# Patient Record
Sex: FEMALE | Race: WHITE | NOT HISPANIC OR LATINO | Employment: OTHER | URBAN - METROPOLITAN AREA
[De-identification: names, ages, dates, MRNs, and addresses within clinical notes are randomized per-mention and may not be internally consistent; named-entity substitution may affect disease eponyms.]

---

## 2017-01-31 ENCOUNTER — OFFICE VISIT (OUTPATIENT)
Dept: OTOLARYNGOLOGY | Facility: CLINIC | Age: 62
End: 2017-01-31
Payer: COMMERCIAL

## 2017-01-31 VITALS
TEMPERATURE: 98 F | HEART RATE: 72 BPM | WEIGHT: 116.88 LBS | BODY MASS INDEX: 20.7 KG/M2 | DIASTOLIC BLOOD PRESSURE: 69 MMHG | SYSTOLIC BLOOD PRESSURE: 100 MMHG

## 2017-01-31 DIAGNOSIS — H61.23 BILATERAL IMPACTED CERUMEN: Primary | ICD-10-CM

## 2017-01-31 PROCEDURE — 69210 REMOVE IMPACTED EAR WAX UNI: CPT | Mod: S$GLB,,, | Performed by: ORTHOPAEDIC SURGERY

## 2017-01-31 PROCEDURE — 99999 PR PBB SHADOW E&M-EST. PATIENT-LVL III: CPT | Mod: PBBFAC,,, | Performed by: ORTHOPAEDIC SURGERY

## 2017-01-31 PROCEDURE — 99499 UNLISTED E&M SERVICE: CPT | Mod: S$GLB,,, | Performed by: ORTHOPAEDIC SURGERY

## 2017-01-31 NOTE — MR AVS SNAPSHOT
[facility]  [address] [place] [address]  Phone: [phone]  Fax: [phone]                  Makayla Ferrer   2017 8:30 AM   Office Visit    Description:  Female : 1955   Provider:  Manuela Ratliff MD   Department[facility]  [facility]           Reason for Visit     Otalgia           Diagnoses this Visit        Comments    Bilateral impacted cerumen    -  Primary            To Do List           Future Appointments        Provider Department Dept Phone    2017 2:15 PM Manuela Ratliff MD [facility] [phone]    2017 12:40 PM Larissa Tello PA-C [facility] [phone]      Goals (5 Years of Data)     None      Follow-Up and Disposition     Return if symptoms worsen or fail to improve.    Follow-up and Disposition History      [facility]     [facility] -  Assistance  Registered nurses in the [facility] provide clinical advisement, health education, appointment booking, and other advisory services.  Call for this free service at [phone].             Medications           Message regarding Medications     Verify the changes and/or additions to your medication regime listed below are the same as discussed with your clinician today.  If any of these changes or additions are incorrect, please notify your healthcare provider.             Verify that the below list of medications is an accurate representation of the medications you are currently taking.  If none reported, the list may be blank. If incorrect, please contact your healthcare provider. Carry this list with you in case of emergency.           Current Medications     artificial tears,hypromellose, 0.3 % Drop Apply to eye.    cholecalciferol, vitamin D3, (VITAMIN D3) 2,000 unit Cap Take 1 capsule by mouth.    FLAXSEED OIL ORAL Take by mouth.    ibandronate (BONIVA) 150 mg tablet Take 150 mg by mouth every 30 days.    METAMUCIL Powd Take by mouth.    polyethylene  glycol (MIRALAX) 17 gram/dose powder Take 17 g by mouth.    magnesium gluconate 27.5 mg (500 mg) Tab Take 500 mg by mouth.           Clinical Reference Information           Vital Signs - Last Recorded  Most recent update: 1/31/2017  8:43 AM by Kemi Hernandez LPN    BP Pulse Temp Wt BMI    100/69 (BP Location: Left arm, Patient Position: Sitting, BP Method: Automatic) 72 97.9 °F (36.6 °C) (Tympanic) 53 kg (116 lb 13.5 oz) 20.7 kg/m2      Blood Pressure          Most Recent Value    BP  100/69      Allergies as of 1/31/2017     Shellfish Containing Products    Alendronate    Cephalexin      Immunizations Administered on Date of Encounter - 1/31/2017     None

## 2017-01-31 NOTE — PROGRESS NOTES
Subjective:   Cerumen impactions     Patient ID: Makayla Ferrer is a 61 y.o. female.    Chief Complaint:  Excessive ear wax     Makayla Ferrer is a 61 y.o. female here to see me today for evaluation of a possible wax impaction in bilateral ears.  She has complaints of hearing loss in the affected ear, but denies pain or drainage.  This has been an issue in the past.  The patient has not been using any sort of ear drop to soften the wax.  She has noted a buzzing sound in her right ear for the last few weeks with her cerumen impaction.    HPI  Review of Systems   HENT: Positive for hearing loss. Negative for ear discharge, ear pain and tinnitus.        Objective:     Physical Exam   HENT:   Right Ear: External ear and ear canal normal. Decreased hearing is noted.   Left Ear: External ear and ear canal normal. Decreased hearing is noted.   Bilateral complete cerumen impactions, removal described below       Procedure Note    CHIEF COMPLAINT:  Cerumen Impaction    Description:  The patient was seated in an exam chair.  An ear speculum was placed in the right EAC and was examined under the microscope.  Suction and/or loop curettes were used to remove a large cerumen impaction.  The tympanic membrane was visualized and was normal in appearance.  The procedure was repeated on the left side in a similar fashion.  The TM was intact and normal on this side as well.  The patient tolerated the procedure well.          Assessment:     1. Bilateral impacted cerumen        Plan:     1.  Cerumen impaction:  Removed today without difficulty.  I would recommend the use of a wax softening drop, either over the counter Debrox or mineral oil, on a weekly basis.  I also instructed the patient to avoid Qtips.

## 2017-06-01 ENCOUNTER — OFFICE VISIT (OUTPATIENT)
Dept: OTOLARYNGOLOGY | Facility: CLINIC | Age: 62
End: 2017-06-01
Payer: COMMERCIAL

## 2017-06-01 VITALS
WEIGHT: 119.06 LBS | HEART RATE: 65 BPM | BODY MASS INDEX: 21.09 KG/M2 | DIASTOLIC BLOOD PRESSURE: 71 MMHG | SYSTOLIC BLOOD PRESSURE: 111 MMHG | HEIGHT: 63 IN | TEMPERATURE: 98 F

## 2017-06-01 DIAGNOSIS — H61.23 BILATERAL IMPACTED CERUMEN: Primary | ICD-10-CM

## 2017-06-01 PROCEDURE — 99999 PR PBB SHADOW E&M-EST. PATIENT-LVL III: CPT | Mod: PBBFAC,,, | Performed by: PHYSICIAN ASSISTANT

## 2017-06-01 PROCEDURE — 99499 UNLISTED E&M SERVICE: CPT | Mod: S$GLB,,, | Performed by: PHYSICIAN ASSISTANT

## 2017-06-01 PROCEDURE — 69210 REMOVE IMPACTED EAR WAX UNI: CPT | Mod: S$GLB,,, | Performed by: PHYSICIAN ASSISTANT

## 2017-06-01 RX ORDER — ALPRAZOLAM 0.25 MG/1
TABLET ORAL
COMMUNITY
Start: 2017-01-24 | End: 2017-10-31

## 2017-06-01 RX ORDER — ESCITALOPRAM OXALATE 5 MG/1
5 TABLET ORAL
COMMUNITY
Start: 2017-03-16 | End: 2018-05-30

## 2017-06-01 NOTE — PROGRESS NOTES
Subjective:   Cerumen impactions     Patient ID: Makayla Ferrer is a 62 y.o. female.    Chief Complaint:  Excessive ear wax     Makayla Ferrer is a 62 y.o. female here to see me today for evaluation of a possible wax impaction in bilateral ears.  She has complaints of hearing loss in the affected ears, but denies pain or drainage.  This has been an issue in the past.  The patient has not been using any sort of ear drop to soften the wax.    HPI  Review of Systems   HENT: Positive for hearing loss. Negative for ear discharge, ear pain and tinnitus.        Objective:     Physical Exam   HENT:   Right Ear: External ear and ear canal normal. Decreased hearing is noted.   Left Ear: External ear and ear canal normal. Decreased hearing is noted.   Bilateral complete cerumen impactions, removal described below       Procedure Note    CHIEF COMPLAINT:  Cerumen Impaction    Description:  The patient was seated in an exam chair.  An ear speculum was placed in the right EAC and was examined under the microscope.  Suction and/or loop curettes were used to remove a small cerumen impaction.  The tympanic membrane was visualized and was normal in appearance.  The procedure was repeated on the left side in a similar fashion.  The TM was intact and normal on this side as well.  The patient tolerated the procedure well.          Assessment:     1. Bilateral impacted cerumen        Plan:     1.  Cerumen impaction:  Removed today without difficulty.  I would recommend the use of a wax softening drop, either over the counter Debrox or mineral oil, on a weekly basis.  I also instructed the patient to avoid Qtips.  She's going to try and space her appointments for ear cleaning to every 4 months instead of every 3 months.

## 2017-10-31 ENCOUNTER — OFFICE VISIT (OUTPATIENT)
Dept: OTOLARYNGOLOGY | Facility: CLINIC | Age: 62
End: 2017-10-31
Payer: COMMERCIAL

## 2017-10-31 VITALS
WEIGHT: 122.13 LBS | TEMPERATURE: 98 F | BODY MASS INDEX: 21.64 KG/M2 | HEART RATE: 72 BPM | HEIGHT: 63 IN | DIASTOLIC BLOOD PRESSURE: 69 MMHG | RESPIRATION RATE: 18 BRPM | SYSTOLIC BLOOD PRESSURE: 117 MMHG

## 2017-10-31 DIAGNOSIS — H61.23 BILATERAL IMPACTED CERUMEN: Primary | ICD-10-CM

## 2017-10-31 PROCEDURE — 99499 UNLISTED E&M SERVICE: CPT | Mod: S$GLB,,, | Performed by: ORTHOPAEDIC SURGERY

## 2017-10-31 PROCEDURE — 99999 PR PBB SHADOW E&M-EST. PATIENT-LVL III: CPT | Mod: PBBFAC,,, | Performed by: ORTHOPAEDIC SURGERY

## 2017-10-31 PROCEDURE — 69210 REMOVE IMPACTED EAR WAX UNI: CPT | Mod: S$GLB,,, | Performed by: ORTHOPAEDIC SURGERY

## 2017-10-31 RX ORDER — ACETAMINOPHEN 500 MG
1 TABLET ORAL DAILY
COMMUNITY

## 2017-10-31 RX ORDER — ZALEPLON 5 MG/1
5 CAPSULE ORAL
COMMUNITY
Start: 2017-06-22

## 2017-10-31 RX ORDER — IBANDRONATE SODIUM 150 MG/1
150 TABLET, FILM COATED ORAL DAILY
COMMUNITY
End: 2018-05-30 | Stop reason: DRUGHIGH

## 2017-10-31 NOTE — PROGRESS NOTES
Subjective:   Cerumen impactions     Patient ID: Makayla Ferrer is a 62 y.o. female.    Chief Complaint:  Excessive ear wax     Makayla Ferrer is a 62 y.o. female here to see me today for evaluation of a possible wax impaction in bilateral ears.  She has complaints of hearing loss in the affected ears, but denies pain or drainage.  This has been an issue in the past.  The patient has not been using any sort of ear drop to soften the wax.    HPI  Review of Systems   HENT: Positive for hearing loss. Negative for ear discharge, ear pain and tinnitus.        Objective:     Physical Exam   HENT:   Right Ear: External ear and ear canal normal. Decreased hearing is noted.   Left Ear: External ear and ear canal normal. Decreased hearing is noted.   Bilateral complete cerumen impactions, removal described below       Procedure Note    CHIEF COMPLAINT:  Cerumen Impaction    Description:  The patient was seated in an exam chair.  An ear speculum was placed in the right EAC and was examined under the microscope.  Suction and/or loop curettes were used to remove a large cerumen impaction.  The tympanic membrane was visualized and was normal in appearance.  The procedure was repeated on the left side in a similar fashion.  The TM was intact and normal on this side as well.  The patient tolerated the procedure well.           Assessment:     1. Bilateral impacted cerumen        Plan:     1.  Cerumen impaction:  Removed today without difficulty.  I would recommend the use of a wax softening drop, either over the counter Debrox or mineral oil, on a weekly basis.  I also instructed the patient to avoid Qtips.

## 2018-05-30 ENCOUNTER — OFFICE VISIT (OUTPATIENT)
Dept: OTOLARYNGOLOGY | Facility: CLINIC | Age: 63
End: 2018-05-30
Payer: COMMERCIAL

## 2018-05-30 VITALS
SYSTOLIC BLOOD PRESSURE: 105 MMHG | BODY MASS INDEX: 20.62 KG/M2 | HEART RATE: 62 BPM | DIASTOLIC BLOOD PRESSURE: 66 MMHG | WEIGHT: 116.38 LBS

## 2018-05-30 DIAGNOSIS — H61.21 IMPACTED CERUMEN, RIGHT EAR: Primary | ICD-10-CM

## 2018-05-30 PROCEDURE — 99999 PR PBB SHADOW E&M-EST. PATIENT-LVL III: CPT | Mod: PBBFAC,,, | Performed by: ORTHOPAEDIC SURGERY

## 2018-05-30 PROCEDURE — 99499 UNLISTED E&M SERVICE: CPT | Mod: S$GLB,,, | Performed by: ORTHOPAEDIC SURGERY

## 2018-05-30 PROCEDURE — 69210 REMOVE IMPACTED EAR WAX UNI: CPT | Mod: S$GLB,,, | Performed by: ORTHOPAEDIC SURGERY

## 2018-05-30 RX ORDER — BUDESONIDE 3 MG/1
9 CAPSULE, COATED PELLETS ORAL DAILY
COMMUNITY
Start: 2018-04-30 | End: 2018-11-20

## 2018-05-30 RX ORDER — SIMETHICONE 125 MG
125 TABLET,CHEWABLE ORAL EVERY 6 HOURS PRN
COMMUNITY
End: 2018-11-20

## 2018-05-30 NOTE — PROGRESS NOTES
Subjective:   Cerumen impactions     Patient ID: Makayla Ferrer is a 63 y.o. female.    Chief Complaint:  Excessive ear wax     Makayla Ferrer is a 63 y.o. female here to see me today for evaluation of a possible wax impaction in the right ear.  She has complaints of hearing loss in the affected ears, but denies pain or drainage.  This has been an issue in the past.  The patient has not been using any sort of ear drop to soften the wax.    HPI  Review of Systems   HENT: Positive for hearing loss. Negative for ear discharge, ear pain and tinnitus.        Objective:     Physical Exam   HENT:   Right Ear: External ear and ear canal normal. Decreased hearing is noted.   Left Ear: External ear and ear canal normal. Decreased hearing is noted.   Bilateral complete cerumen impactions, removal described below       Procedure Note    CHIEF COMPLAINT:  Cerumen Impaction    Description:  The patient was seated in an exam chair.  An ear speculum was placed in the right EAC and was examined under the microscope.  Suction and/or loop curettes were used to remove a large cerumen impaction.  The tympanic membrane was visualized and was normal in appearance.  The patient tolerated the procedure well.           Assessment:     1. Impacted cerumen, right ear        Plan:     1.  Cerumen impaction:  Removed today without difficulty.  I would recommend the use of a wax softening drop, either over the counter Debrox or mineral oil, on a weekly basis.  I also instructed the patient to avoid Qtips.

## 2018-09-11 ENCOUNTER — OFFICE VISIT (OUTPATIENT)
Dept: OTOLARYNGOLOGY | Facility: CLINIC | Age: 63
End: 2018-09-11
Payer: COMMERCIAL

## 2018-09-11 VITALS
SYSTOLIC BLOOD PRESSURE: 100 MMHG | WEIGHT: 110.25 LBS | DIASTOLIC BLOOD PRESSURE: 62 MMHG | BODY MASS INDEX: 19.53 KG/M2 | HEART RATE: 71 BPM | TEMPERATURE: 97 F

## 2018-09-11 DIAGNOSIS — H61.21 IMPACTED CERUMEN, RIGHT EAR: Primary | ICD-10-CM

## 2018-09-11 PROCEDURE — 99999 PR PBB SHADOW E&M-EST. PATIENT-LVL III: CPT | Mod: PBBFAC,,, | Performed by: PHYSICIAN ASSISTANT

## 2018-09-11 PROCEDURE — 69210 REMOVE IMPACTED EAR WAX UNI: CPT | Mod: S$GLB,,, | Performed by: PHYSICIAN ASSISTANT

## 2018-09-11 PROCEDURE — 99213 OFFICE O/P EST LOW 20 MIN: CPT | Mod: 25,S$GLB,, | Performed by: PHYSICIAN ASSISTANT

## 2018-09-11 NOTE — PROGRESS NOTES
Subjective:       Patient ID: Makayla Ferrer is a 63 y.o. female.    Chief Complaint: Other (wax in ears)    Patient is here to see me today for evaluation of a possible wax impaction in bilateral ears.  She has complaints of hearing loss in the affected ears, but denies pain or drainage.  This has been an issue in the past.  The patient has been using any sort of ear drop to soften the wax.      Review of Systems   Constitutional: Negative for chills, fatigue, fever and unexpected weight change.   HENT: Positive for hearing loss (muffled hearing). Negative for congestion, dental problem, ear discharge, ear pain, facial swelling, nosebleeds, postnasal drip, rhinorrhea, sinus pressure, sneezing, sore throat, tinnitus, trouble swallowing and voice change.    Eyes: Negative for redness, itching and visual disturbance.   Respiratory: Negative for cough, choking, shortness of breath and wheezing.    Cardiovascular: Negative for chest pain and palpitations.   Gastrointestinal: Negative for abdominal pain.        No reflux.   Musculoskeletal: Negative for gait problem.   Skin: Negative for rash.   Neurological: Negative for dizziness, light-headedness and headaches.       Objective:      Physical Exam   Constitutional: She is oriented to person, place, and time. She appears well-developed and well-nourished. No distress.   HENT:   Head: Normocephalic and atraumatic.   Right Ear: Tympanic membrane, external ear and ear canal normal.   Left Ear: Tympanic membrane, external ear and ear canal normal.   Nose: Nose normal. No mucosal edema, rhinorrhea, nasal deformity or septal deviation. No epistaxis. Right sinus exhibits no maxillary sinus tenderness and no frontal sinus tenderness. Left sinus exhibits no maxillary sinus tenderness and no frontal sinus tenderness.   Mouth/Throat: Uvula is midline, oropharynx is clear and moist and mucous membranes are normal. Mucous membranes are not pale and not dry. No dental caries.  No oropharyngeal exudate or posterior oropharyngeal erythema.   Cerumen impaction to right    Eyes: Conjunctivae, EOM and lids are normal. Pupils are equal, round, and reactive to light. Right eye exhibits no chemosis. Left eye exhibits no chemosis. Right conjunctiva is not injected. Left conjunctiva is not injected. No scleral icterus. Right eye exhibits normal extraocular motion and no nystagmus. Left eye exhibits normal extraocular motion and no nystagmus.   Neck: Trachea normal and phonation normal. No tracheal tenderness present. No tracheal deviation present. No thyroid mass and no thyromegaly present.   Cardiovascular: Intact distal pulses.   Pulmonary/Chest: Effort normal. No stridor. No respiratory distress.   Abdominal: She exhibits no distension.   Lymphadenopathy:        Head (right side): No submental, no submandibular, no preauricular, no posterior auricular and no occipital adenopathy present.        Head (left side): No submental, no submandibular, no preauricular, no posterior auricular and no occipital adenopathy present.     She has no cervical adenopathy.   Neurological: She is alert and oriented to person, place, and time. No cranial nerve deficit.   Skin: Skin is warm and dry. No rash noted. No erythema.   Psychiatric: She has a normal mood and affect. Her behavior is normal.         Procedure Note    CHIEF COMPLAINT:  Cerumen Impaction    Description:  The patient was seated in an exam chair.  An ear speculum was placed in the right EAC and was examined under the microscope.  Suction and/or loop curettes were used to remove a large cerumen impaction.  The tympanic membrane was visualized and was normal in appearance.  The procedure was repeated on the left side in a similar fashion.  The TM was intact and normal on this side as well.  The patient tolerated the procedure well.  Assessment:       1. Impacted cerumen, right ear        Plan:        Cerumen impaction:  Removed today without  difficulty.  I would recommend the use of a wax softening drop, either over the counter Debrox or mineral oil, on a weekly basis.  I also instructed the patient to avoid Qtips.

## 2018-11-20 ENCOUNTER — OFFICE VISIT (OUTPATIENT)
Dept: OTOLARYNGOLOGY | Facility: CLINIC | Age: 63
End: 2018-11-20
Payer: COMMERCIAL

## 2018-11-20 VITALS
BODY MASS INDEX: 19.29 KG/M2 | DIASTOLIC BLOOD PRESSURE: 63 MMHG | WEIGHT: 108.94 LBS | HEART RATE: 68 BPM | TEMPERATURE: 97 F | SYSTOLIC BLOOD PRESSURE: 103 MMHG

## 2018-11-20 DIAGNOSIS — H61.23 BILATERAL IMPACTED CERUMEN: Primary | ICD-10-CM

## 2018-11-20 PROCEDURE — 99999 PR PBB SHADOW E&M-EST. PATIENT-LVL III: CPT | Mod: PBBFAC,,, | Performed by: ORTHOPAEDIC SURGERY

## 2018-11-20 PROCEDURE — G0268 REMOVAL OF IMPACTED WAX MD: HCPCS | Mod: S$GLB,,, | Performed by: ORTHOPAEDIC SURGERY

## 2018-11-20 PROCEDURE — 99499 UNLISTED E&M SERVICE: CPT | Mod: S$GLB,,, | Performed by: ORTHOPAEDIC SURGERY

## 2018-11-20 NOTE — PROGRESS NOTES
Subjective:   Cerumen impactions     Patient ID: Makayla Ferrer is a 63 y.o. female.    Chief Complaint:  Excessive ear wax     Makayla Ferrer is a 63 y.o. female here to see me today for evaluation of a possible wax impaction in bilateral ears.  She has complaints of hearing loss in the affected ears, but denies pain or drainage.  This has been an issue in the past.  The patient has not been using any sort of ear drop to soften the wax.    HPI  Review of Systems   HENT: Positive for hearing loss. Negative for ear discharge, ear pain and tinnitus.        Objective:     Physical Exam   HENT:   Right Ear: External ear and ear canal normal. Decreased hearing is noted.   Left Ear: External ear and ear canal normal. Decreased hearing is noted.   Bilateral complete cerumen impactions, removal described below       Procedure Note    CHIEF COMPLAINT:  Cerumen Impaction    Description:  The patient was seated in an exam chair.  An ear speculum was placed in the right EAC and was examined under the microscope.  Suction and/or loop curettes were used to remove a large cerumen impaction.  The tympanic membrane was visualized and was normal in appearance.  The procedure was repeated on the left side in a similar fashion.  The TM was intact and normal on this side as well.  The patient tolerated the procedure well.           Assessment:     1. Bilateral impacted cerumen        Plan:     1.  Cerumen impaction:  Removed today without difficulty.  I would recommend the use of a wax softening drop, either over the counter Debrox or mineral oil, on a weekly basis.  I also instructed the patient to avoid Qtips.

## 2019-01-08 ENCOUNTER — TELEPHONE (OUTPATIENT)
Dept: GASTROENTEROLOGY | Facility: CLINIC | Age: 64
End: 2019-01-08

## 2019-01-08 NOTE — TELEPHONE ENCOUNTER
Spoke with patient about scheduling an office visit as a new patient. Patient stated that she would like to schedule an appointment with Dr. Bean. Staff informed patient that Dr. Bean is no longer here at the [facility] [place], Dr. Bean relocated to the [facility]. Staff offered patient an appointment with one of the doctors here or Nurse Practitioner. Patient stated that she will give the [facility] a call to schedule an appointment with Dr. Bean. Staff gave patient the number to schedule with Dr. Bean on the [facility].

## 2019-01-08 NOTE — TELEPHONE ENCOUNTER
----- Message from Tania Ellison sent at 1/8/2019  3:24 PM CST -----  Contact: self  Pt is calling in regards to scheduling an appt for colitis. Books aren't currently open to schedule this appt.     Pt would like a call back to schedule at [phone].    Thank you

## 2019-01-11 ENCOUNTER — TELEPHONE (OUTPATIENT)
Dept: INTERNAL MEDICINE | Facility: CLINIC | Age: 64
End: 2019-01-11

## 2019-01-11 NOTE — TELEPHONE ENCOUNTER
----- Message from Rakel Sam LPN sent at 1/11/2019  9:32 AM CST -----  Contact: Pt Mobile [phone]      ----- Message -----  From: Crystal Olsen MD  Sent: 1/11/2019   8:52 AM  To: Rakel Sam LPN    Ok to take as new pt, she's someones family member  And I agreed when our pt was in for a visit  ----- Message -----  From: Rakel Sam LPN  Sent: 1/11/2019   8:32 AM  To: Crystal Olsen MD    Pt never seen in primary care.  ----- Message -----  From: Sophia Villeda  Sent: 1/10/2019   4:18 PM  To: Raúl Ta Staff    Patient is calling in regards to wanting you to be her PCP. I have explained to her that you're not taking any new patients. She would like for you to call her please.

## 2019-01-11 NOTE — TELEPHONE ENCOUNTER
Spoke to pt. Pt advised on Dr. Espinoza's schedule.  Pt stated that she was looking for a second opinion.   I clarified that she would have to leave her present internist to establish care with Dr. Espinoza. Pt has an internist in [place], where she lives.  Pt stated that she was not sure if she wanted to do that. Pt looking for guidance on specialists.  Pt will call back to let us know if she chooses to switch.

## 2019-05-10 ENCOUNTER — TELEPHONE (OUTPATIENT)
Dept: SURGERY | Facility: CLINIC | Age: 64
End: 2019-05-10

## 2019-05-10 NOTE — TELEPHONE ENCOUNTER
Left message for pt to call office back      ----- Message from Jaida Liu sent at 5/10/2019  3:50 PM CDT -----  Contact: Self   Type: Patient Call Back    Who called: Self     What is the request in detail: Patient is asking to speak with the office     Can the clinic reply by [facility]? Call    Would the patient rather a call back or a response via [facility]?  Call     Best call back number: [phone]

## 2019-06-03 ENCOUNTER — OFFICE VISIT (OUTPATIENT)
Dept: GASTROENTEROLOGY | Facility: CLINIC | Age: 64
End: 2019-06-03
Payer: MEDICARE

## 2019-06-03 ENCOUNTER — LAB VISIT (OUTPATIENT)
Dept: LAB | Facility: HOSPITAL | Age: 64
End: 2019-06-03
Attending: INTERNAL MEDICINE
Payer: MEDICARE

## 2019-06-03 VITALS
DIASTOLIC BLOOD PRESSURE: 58 MMHG | BODY MASS INDEX: 16.8 KG/M2 | HEIGHT: 63 IN | HEART RATE: 58 BPM | SYSTOLIC BLOOD PRESSURE: 98 MMHG | WEIGHT: 94.81 LBS

## 2019-06-03 DIAGNOSIS — D52.0 DIETARY FOLATE DEFICIENCY ANEMIA: ICD-10-CM

## 2019-06-03 DIAGNOSIS — R19.7 DIARRHEA, UNSPECIFIED TYPE: ICD-10-CM

## 2019-06-03 DIAGNOSIS — R53.83 FATIGUE, UNSPECIFIED TYPE: ICD-10-CM

## 2019-06-03 DIAGNOSIS — R19.7 DIARRHEA, UNSPECIFIED TYPE: Primary | ICD-10-CM

## 2019-06-03 DIAGNOSIS — D64.9 ANEMIA, UNSPECIFIED TYPE: ICD-10-CM

## 2019-06-03 LAB
ANION GAP SERPL CALC-SCNC: 8 MMOL/L (ref 8–16)
BUN SERPL-MCNC: 31 MG/DL (ref 8–23)
CALCIUM SERPL-MCNC: 10.3 MG/DL (ref 8.7–10.5)
CHLORIDE SERPL-SCNC: 106 MMOL/L (ref 95–110)
CO2 SERPL-SCNC: 26 MMOL/L (ref 23–29)
CREAT SERPL-MCNC: 1.1 MG/DL (ref 0.5–1.4)
EST. GFR  (AFRICAN AMERICAN): >60 ML/MIN/1.73 M^2
EST. GFR  (NON AFRICAN AMERICAN): 52.8 ML/MIN/1.73 M^2
GLUCOSE SERPL-MCNC: 85 MG/DL (ref 70–110)
POTASSIUM SERPL-SCNC: 4.1 MMOL/L (ref 3.5–5.1)
SODIUM SERPL-SCNC: 140 MMOL/L (ref 136–145)

## 2019-06-03 PROCEDURE — 36415 COLL VENOUS BLD VENIPUNCTURE: CPT

## 2019-06-03 PROCEDURE — 99213 OFFICE O/P EST LOW 20 MIN: CPT | Mod: PBBFAC | Performed by: INTERNAL MEDICINE

## 2019-06-03 PROCEDURE — 80048 BASIC METABOLIC PNL TOTAL CA: CPT

## 2019-06-03 PROCEDURE — 82607 VITAMIN B-12: CPT

## 2019-06-03 PROCEDURE — 99203 PR OFFICE/OUTPT VISIT, NEW, LEVL III, 30-44 MIN: ICD-10-PCS | Mod: S$PBB,,, | Performed by: INTERNAL MEDICINE

## 2019-06-03 PROCEDURE — 99203 OFFICE O/P NEW LOW 30 MIN: CPT | Mod: S$PBB,,, | Performed by: INTERNAL MEDICINE

## 2019-06-03 PROCEDURE — 99999 PR PBB SHADOW E&M-EST. PATIENT-LVL III: CPT | Mod: PBBFAC,,, | Performed by: INTERNAL MEDICINE

## 2019-06-03 PROCEDURE — 99999 PR PBB SHADOW E&M-EST. PATIENT-LVL III: ICD-10-PCS | Mod: PBBFAC,,, | Performed by: INTERNAL MEDICINE

## 2019-06-03 NOTE — PROGRESS NOTES
"Clinic Consult:  [facility] Consultation Note    Reason for Consult:  The primary encounter diagnosis was Diarrhea, unspecified type. Diagnoses of Fatigue, unspecified type, Anemia, unspecified type, and Dietary folate deficiency anemia  were also pertinent to this visit.    PCP: Sebas Erickson   No address on file    HPI:  This is a 64 y.o. female here for evaluation of chronic diarrhea.  She was diagnosed with lymphocytic colitis and has done better but still having problems.      lymphocyctic colitis, took endocort for 3 months (finished in Sept 2018).  Symptoms improved after that (diarrhea, cramping pain).  Continues to have diarrhea and a "sensitive system".      Lost 30 pounds since diagnosis.  Raw fruits and sugar make diarrhea worst.      Now on SCD diet, having 1-2 BMs daily, formed initially with mucus.  Has mild cramping pain now at night.  Using heating pad     Constipated with eating yogurt    Has lots of fatigue.      24 hour diet recall:  Avocado, tomato, red peppers, and egg   Homemade chicken soup  Grilled chicken    Biggest issue is diarrhea and gas.      ROS:  CONSTITUTIONAL: Denies weight change,  fatigue, fevers, chills, night sweats.  EYES: No changes in vision.   ENT: No oral lesions or sore throat.  HEMATOLOGICAL/Lymph: Denies bleeding tendency, bruising tendency. No swellings or enlarged lymph nodes.  CARDIOVASCULAR: Denies chest pain, shortness of breath, orthopnea and edema.  RESPIRATORY: Denies cough, hemoptysis, dyspnea, and wheezing.  GI: See HPI.  : Denies dysuria and hematuria  MUSCULOSKELETAL: Denies joint pain or swelling, back pain and muscle pain.  SKIN: Denies rashes.  NEUROLOGIC: Denies headaches, seizures and numbness.  PSYCHIATRIC: Denies depression or anxiety.  ENDOCRINE: Denies heat or cold intolerance and excessive thirst or urination.    Medical History:   Past Medical History:   Diagnosis Date    Allergic rhinitis     Basal cell carcinoma     " "Constipation     ITP (idiopathic thrombocytopenic purpura) 06/2015    Microscopic colitis     Migraines     Osteoporosis     Plantar fasciitis, bilateral        Surgical History:  Past Surgical History:   Procedure Laterality Date    TONSILLECTOMY, ADENOIDECTOMY         Family History:   Family History   Problem Relation Age of Onset    Hypertension Unknown     Melanoma Unknown     Thyroid disease Mother     Migraines Neg Hx     Asthma Neg Hx     Cancer Neg Hx        Social History:   Social History     Tobacco Use    Smoking status: Never Smoker    Smokeless tobacco: Never Used   Substance Use Topics    Alcohol use: Yes     Comment: once a week    Drug use: Never       Allergies: Reviewed    Home Medications:   Medication List with Changes/Refills   Current Medications    CARBAMIDE PEROXIDE (DEBROX) 6.5 % OTIC SOLUTION    Place 5 drops into both ears as needed.    CHOLECALCIFEROL, VITAMIN D3, 2,000 UNIT CAP    Take 1 capsule by mouth once daily.    DIPHENHYDRAMINE HCL 12.5 MG/5 ML PFSP    Take by mouth daily as needed.    FLAXSEED OIL ORAL    Take 1 tablet by mouth once daily.     PROPYLENE GLYCOL-GLYCERIN (SOOTHE LUBRICANT) 0.6-0.6 % DPET    Apply to eye once daily.    WHITE PETROLATUM-MINERAL OIL 80-20 % OINT    Apply to eye every evening.    ZALEPLON (SONATA) 5 MG CAP    Take 5 mg by mouth as needed.         Physical Exam:  Vital Signs:  BP (!) 98/58   Pulse (!) 58   Ht 5' 3" (1.6 m)   Wt 43 kg (94 lb 12.8 oz)   BMI 16.79 kg/m²   Body mass index is 16.79 kg/m².      GENERAL: No acute distress, A&Ox3  EYES: Anicteric, no pallor noted.  ENT: OP clear  NECK: Supple, no masses, no thyromegally.  CHEST: Equal breath sounds bilaterally, no wheezing.  CARDIOVASCULAR: Regular rate and rhythm. Murmurs, rubs and gallops absent.  ABDOMEN: soft, non-tender, non-distended, normal bowel sounds, no hepatosplenomegaly   EXTREMITIES: No clubbing, cyanosis or edema.  SKIN: Without lesion or erythema.  LYMPH: " No cervical, axillary lymphadenopathy palpable.   NEUROLOGICAL: Grossly normal, no asterixis present.    Labs: Pertinent labs reviewed.    Assessment and Plan:  Diarrhea, unspecified type  Most consistent with IBS.    Recommend continue to avoid dairy.    Add metamucil to bulk stool      -     Basic metabolic panel; Future; Expected date: 06/03/2019    Fatigue, unspecified type    Anemia, unspecified type  -     Vitamin B12; Future; Expected date: 06/03/2019    Dietary folate deficiency anemia   -     Vitamin B12; Future; Expected date: 06/03/2019          Follow up in about 2 months (around 8/3/2019).        Thank you so much for allowing me to participate in the care of Makayla Doshi MD

## 2019-06-04 ENCOUNTER — PATIENT MESSAGE (OUTPATIENT)
Dept: GASTROENTEROLOGY | Facility: CLINIC | Age: 64
End: 2019-06-04

## 2019-06-04 LAB — VIT B12 SERPL-MCNC: 325 PG/ML (ref 210–950)

## 2019-06-05 PROBLEM — R19.7 DIARRHEA: Status: ACTIVE | Noted: 2019-06-05

## 2019-06-10 ENCOUNTER — PATIENT MESSAGE (OUTPATIENT)
Dept: GASTROENTEROLOGY | Facility: CLINIC | Age: 64
End: 2019-06-10

## 2019-06-10 ENCOUNTER — TELEPHONE (OUTPATIENT)
Dept: GASTROENTEROLOGY | Facility: CLINIC | Age: 64
End: 2019-06-10

## 2019-06-10 NOTE — TELEPHONE ENCOUNTER
----- Message from Kendal Vázquez sent at 6/10/2019 11:43 AM CDT -----  Contact: pt  She's calling in regards to, visit pt stated she sent a message via [facility]   Pt also has three questions       pls call pt back at [phone]

## 2019-06-10 NOTE — TELEPHONE ENCOUNTER
Did not return pt's call as she has written an extensive email to Dr. Doshi with the questions and updates that she wants to talk about.  Forwarded that email to Dr. Doshi today.

## 2019-06-19 ENCOUNTER — APPOINTMENT (RX ONLY)
Dept: URBAN - METROPOLITAN AREA CLINIC 18 | Facility: CLINIC | Age: 64
Setting detail: DERMATOLOGY
End: 2019-06-19

## 2019-06-19 DIAGNOSIS — L74.0 MILIARIA RUBRA: ICD-10-CM

## 2019-06-19 DIAGNOSIS — L20.89 OTHER ATOPIC DERMATITIS: ICD-10-CM

## 2019-06-19 PROBLEM — L30.9 DERMATITIS, UNSPECIFIED: Status: ACTIVE | Noted: 2019-06-19

## 2019-06-19 PROCEDURE — ? COUNSELING

## 2019-06-19 PROCEDURE — 11102 TANGNTL BX SKIN SINGLE LES: CPT

## 2019-06-19 PROCEDURE — ? BIOPSY BY SHAVE METHOD

## 2019-06-19 PROCEDURE — 99202 OFFICE O/P NEW SF 15 MIN: CPT | Mod: 25

## 2019-06-19 ASSESSMENT — LOCATION SIMPLE DESCRIPTION DERM
LOCATION SIMPLE: CHEST
LOCATION SIMPLE: RIGHT THIGH

## 2019-06-19 ASSESSMENT — LOCATION ZONE DERM
LOCATION ZONE: TRUNK
LOCATION ZONE: LEG

## 2019-06-19 ASSESSMENT — LOCATION DETAILED DESCRIPTION DERM
LOCATION DETAILED: LEFT MEDIAL SUPERIOR CHEST
LOCATION DETAILED: RIGHT ANTERIOR PROXIMAL THIGH

## 2019-06-19 NOTE — PROCEDURE: BIOPSY BY SHAVE METHOD
Wound Care: Vaseline
X Size Of Lesion In Cm: 0
Biopsy Method: Dermablade
Type Of Destruction Used: Curettage
Anesthesia Volume In Cc: 0.5
Bill For Surgical Tray: no
Electrodesiccation Text: The wound bed was treated with electrodesiccation after the biopsy was performed.
Billing Type: Third-Party Bill
Curettage Text: The wound bed was treated with curettage after the biopsy was performed.
Dressing: bandage
Consent: Written consent was obtained and risks were reviewed including but not limited to scarring, infection, bleeding, scabbing, incomplete removal, nerve damage and allergy to anesthesia.
Notification Instructions: Patient will be notified of biopsy results. However, patient instructed to call the office if not contacted within 2 weeks.
Was A Bandage Applied: Yes
Depth Of Biopsy: dermis
Silver Nitrate Text: The wound bed was treated with silver nitrate after the biopsy was performed.
Detail Level: Detailed
Hemostasis: Ferric chloride
Cryotherapy Text: The wound bed was treated with cryotherapy after the biopsy was performed.
Electrodesiccation And Curettage Text: The wound bed was treated with electrodesiccation and curettage after the biopsy was performed.
Biopsy Type: H and E
Anesthesia Type: 1% lidocaine with epinephrine and a 1:10 solution of 8.4% sodium bicarbonate
Post-Care Instructions: I reviewed with the patient in detail post-care instructions. Patient is to keep the biopsy site dry overnight, and then apply bacitracin twice daily until healed. Patient may apply hydrogen peroxide soaks to remove any crusting.

## 2019-06-27 ENCOUNTER — TELEPHONE (OUTPATIENT)
Dept: GASTROENTEROLOGY | Facility: CLINIC | Age: 64
End: 2019-06-27

## 2019-06-27 NOTE — TELEPHONE ENCOUNTER
----- Message from Amanda Aguilar LPN sent at 6/26/2019  4:26 PM CDT -----  Contact: Patient      ----- Message -----  From: Mary Gtz  Sent: 6/26/2019   1:10 PM  To: Tico Billy Staff    Type:  Needs Medical Advice    Who Called: Patient  Symptoms (please be specific):    How long has patient had these symptoms:    Pharmacy name and phone #:    Would the patient rather a call back or a response via [facility]? call  Best Call Back Number: [phone]  Additional Information: Patient received an email around 6/1 regarding the scheduling of a  SIBO test

## 2019-06-27 NOTE — TELEPHONE ENCOUNTER
Called pt back and gave her the instructions for having the SIBO testing done.  As per Dr. Estrada's nurse in [place], Kathy, this is a test done through a company called [employer].  There will be a form filled out by Dr. Doshi with a diagnosis and her signature and faxed to [employer] and then they in turn will send the kit for the SIBO testing to the patient with instructions.  The pt will then perform the test at home and mail back to [employer].  The result of the test will be sent to Dr. Doshi.  I have asked for the form to be sent to me to fill out on behalf of pt.

## 2019-07-26 ENCOUNTER — OFFICE VISIT (OUTPATIENT)
Dept: GASTROENTEROLOGY | Facility: CLINIC | Age: 64
End: 2019-07-26
Payer: MEDICARE

## 2019-07-26 VITALS
BODY MASS INDEX: 17.32 KG/M2 | WEIGHT: 97.75 LBS | SYSTOLIC BLOOD PRESSURE: 112 MMHG | HEART RATE: 72 BPM | HEIGHT: 63 IN | DIASTOLIC BLOOD PRESSURE: 80 MMHG

## 2019-07-26 DIAGNOSIS — R19.7 DIARRHEA, UNSPECIFIED TYPE: Primary | ICD-10-CM

## 2019-07-26 DIAGNOSIS — K52.832 LYMPHOCYTIC COLITIS: Primary | ICD-10-CM

## 2019-07-26 PROCEDURE — 99214 PR OFFICE/OUTPT VISIT, EST, LEVL IV, 30-39 MIN: ICD-10-PCS | Mod: S$GLB,,, | Performed by: INTERNAL MEDICINE

## 2019-07-26 PROCEDURE — 99214 OFFICE O/P EST MOD 30 MIN: CPT | Mod: S$GLB,,, | Performed by: INTERNAL MEDICINE

## 2019-07-26 RX ORDER — SODIUM, POTASSIUM,MAG SULFATES 17.5-3.13G
1 SOLUTION, RECONSTITUTED, ORAL ORAL DAILY
Qty: 1 KIT | Refills: 0 | Status: SHIPPED | OUTPATIENT
Start: 2019-07-26 | End: 2019-07-28

## 2019-07-26 NOTE — LETTER
July 28, 2019      Sebas Erickson MD  [address] [facility] [address]           [facility]  [address] [facility] [address] [place] [address]  Phone: [phone]  Fax: [phone]          Patient: Makayla Ferrer   MR Number: [id number]   YOB: 1955   Date of Visit: 7/26/2019       Dear Dr. Sebas Erickson:    Thank you for referring Makayla Ferrer to me for evaluation. Attached you will find relevant portions of my assessment and plan of care.    If you have questions, please do not hesitate to call me. I look forward to following Makayla Ferrer along with you.    Sincerely,    Elenita Bean MD    Enclosure  CC:  No Recipients    If you would like to receive this communication electronically, please contact [email or web address] or [phone] to request more information on "[employer] Link access.    For providers and/or their staff who would like to refer a patient to [facility], please contact us through our one-stop-shop provider referral line, [facility], at [phone].    If you feel you have received this communication in error or would no longer like to receive these types of communications, please e-mail [email or web address]

## 2019-07-29 ENCOUNTER — RX ONLY (OUTPATIENT)
Age: 64
Setting detail: RX ONLY
End: 2019-07-29

## 2019-07-29 RX ORDER — CLOBETASOL PROPIONATE 0.5 MG/G
CREAM TOPICAL
Qty: 1 | Refills: 2 | Status: ERX | COMMUNITY
Start: 2019-07-29

## 2019-07-29 NOTE — PROGRESS NOTES
Subjective:       Patient ID: Makayla Ferrer is a 64 y.o. female.    Chief Complaint: Other (diarrhea,constipation)    HPI Seeking another opinion. Was diagnosed and treated for Lymphocytic colitis and completed a 3 months course of Entocort in September 2018. She continues to have diarrhea and constipation with certain food, She has lost 30 pounds in the last one year. She denies nausea or vomiting.  Review of Systems   Constitutional: Negative for appetite change and fever.   HENT: Negative for sore throat and trouble swallowing.    Eyes: Negative for photophobia and visual disturbance.   Respiratory: Negative for wheezing.    Cardiovascular: Negative for chest pain and palpitations.   Gastrointestinal:        See HPI for details   Musculoskeletal: Negative for arthralgias, joint swelling and neck pain.   Skin: Negative for rash and wound.   Neurological: Negative for dizziness, tremors and weakness.   Psychiatric/Behavioral: Negative for behavioral problems and suicidal ideas.       Objective:      Physical Exam   Constitutional: She is oriented to person, place, and time. She appears well-nourished.   HENT:   Mouth/Throat: Oropharynx is clear and moist.   Eyes: Pupils are equal, round, and reactive to light.   Neck: Neck supple.   Cardiovascular: Normal heart sounds.   Pulmonary/Chest: Effort normal and breath sounds normal.   Abdominal: Soft. She exhibits no mass. There is no tenderness. There is no guarding.   Musculoskeletal: Normal range of motion.   Lymphadenopathy:     She has no cervical adenopathy.   Neurological: She is alert and oriented to person, place, and time.   Skin: Skin is warm. No rash noted.   Psychiatric: She has a normal mood and affect.       Assessment:       1. Lymphocytic colitis        Plan:       Need to ascertain that the persistent diarrhea is not from the lymphocytic colitis. However cannot rule out IBS.  We will proceed with EGD with biopsy. If LC, will need maintenance  treatment for LC.  If LC negative we'll treat as IBS.    Plan discussed with patient and her , all their questions answered.

## 2019-08-02 ENCOUNTER — TELEPHONE (OUTPATIENT)
Dept: SURGERY | Facility: CLINIC | Age: 64
End: 2019-08-02

## 2019-08-02 NOTE — TELEPHONE ENCOUNTER
Pt returned my call.  Pt notified we need to r/s her colonoscopy from 8/16/19 to 9/6/19.  Ensured pt that I will call her if we get a cancellation for 8/23/19.

## 2019-09-03 ENCOUNTER — PATIENT MESSAGE (OUTPATIENT)
Dept: ADMINISTRATIVE | Facility: OTHER | Age: 64
End: 2019-09-03

## 2019-09-05 ENCOUNTER — ANESTHESIA EVENT (OUTPATIENT)
Dept: ENDOSCOPY | Facility: HOSPITAL | Age: 64
End: 2019-09-05
Payer: MEDICARE

## 2019-09-06 ENCOUNTER — ANESTHESIA (OUTPATIENT)
Dept: ENDOSCOPY | Facility: HOSPITAL | Age: 64
End: 2019-09-06
Payer: MEDICARE

## 2019-09-06 ENCOUNTER — HOSPITAL ENCOUNTER (OUTPATIENT)
Facility: HOSPITAL | Age: 64
Discharge: HOME | End: 2019-09-06
Attending: INTERNAL MEDICINE | Admitting: INTERNAL MEDICINE
Payer: MEDICARE

## 2019-09-06 VITALS
WEIGHT: 97 LBS | HEART RATE: 72 BPM | OXYGEN SATURATION: 100 % | RESPIRATION RATE: 20 BRPM | TEMPERATURE: 98 F | SYSTOLIC BLOOD PRESSURE: 108 MMHG | DIASTOLIC BLOOD PRESSURE: 76 MMHG | BODY MASS INDEX: 17.19 KG/M2 | HEIGHT: 63 IN

## 2019-09-06 DIAGNOSIS — R19.7 DIARRHEA, UNSPECIFIED TYPE: Primary | ICD-10-CM

## 2019-09-06 PROCEDURE — 25000003 PHARM REV CODE 250: Performed by: NURSE ANESTHETIST, CERTIFIED REGISTERED

## 2019-09-06 PROCEDURE — 63600175 PHARM REV CODE 636 W HCPCS: Performed by: NURSE ANESTHETIST, CERTIFIED REGISTERED

## 2019-09-06 PROCEDURE — D9220A PRA ANESTHESIA: Mod: ANES,,, | Performed by: ANESTHESIOLOGY

## 2019-09-06 PROCEDURE — 45380 COLONOSCOPY AND BIOPSY: CPT | Performed by: INTERNAL MEDICINE

## 2019-09-06 PROCEDURE — 27201012 HC FORCEPS, HOT/COLD, DISP: Performed by: INTERNAL MEDICINE

## 2019-09-06 PROCEDURE — 37000008 HC ANESTHESIA 1ST 15 MINUTES: Performed by: INTERNAL MEDICINE

## 2019-09-06 PROCEDURE — 88305 TISSUE SPECIMEN TO PATHOLOGY - SURGERY: ICD-10-PCS | Mod: 26,,, | Performed by: PATHOLOGY

## 2019-09-06 PROCEDURE — D9220A PRA ANESTHESIA: ICD-10-PCS | Mod: CRNA,,, | Performed by: NURSE ANESTHETIST, CERTIFIED REGISTERED

## 2019-09-06 PROCEDURE — 63600175 PHARM REV CODE 636 W HCPCS: Performed by: ANESTHESIOLOGY

## 2019-09-06 PROCEDURE — 37000009 HC ANESTHESIA EA ADD 15 MINS: Performed by: INTERNAL MEDICINE

## 2019-09-06 PROCEDURE — 88305 TISSUE EXAM BY PATHOLOGIST: CPT | Performed by: PATHOLOGY

## 2019-09-06 PROCEDURE — 88305 TISSUE EXAM BY PATHOLOGIST: CPT | Mod: 26,,, | Performed by: PATHOLOGY

## 2019-09-06 PROCEDURE — 45380 COLONOSCOPY AND BIOPSY: CPT | Mod: ,,, | Performed by: INTERNAL MEDICINE

## 2019-09-06 PROCEDURE — D9220A PRA ANESTHESIA: Mod: CRNA,,, | Performed by: NURSE ANESTHETIST, CERTIFIED REGISTERED

## 2019-09-06 PROCEDURE — 45380 PR COLONOSCOPY,BIOPSY: ICD-10-PCS | Mod: ,,, | Performed by: INTERNAL MEDICINE

## 2019-09-06 PROCEDURE — D9220A PRA ANESTHESIA: ICD-10-PCS | Mod: ANES,,, | Performed by: ANESTHESIOLOGY

## 2019-09-06 RX ORDER — PROPOFOL 10 MG/ML
VIAL (ML) INTRAVENOUS
Status: DISCONTINUED | OUTPATIENT
Start: 2019-09-06 | End: 2019-09-06

## 2019-09-06 RX ORDER — PHENYLEPHRINE HCL IN 0.9% NACL 1 MG/10 ML
SYRINGE (ML) INTRAVENOUS
Status: DISCONTINUED
Start: 2019-09-06 | End: 2019-09-06 | Stop reason: HOSPADM

## 2019-09-06 RX ORDER — LIDOCAINE HYDROCHLORIDE 10 MG/ML
1 INJECTION, SOLUTION EPIDURAL; INFILTRATION; INTRACAUDAL; PERINEURAL ONCE
Status: DISCONTINUED | OUTPATIENT
Start: 2019-09-06 | End: 2019-09-06 | Stop reason: HOSPADM

## 2019-09-06 RX ORDER — SODIUM CHLORIDE 9 MG/ML
INJECTION, SOLUTION INTRAVENOUS CONTINUOUS
Status: DISCONTINUED | OUTPATIENT
Start: 2019-09-06 | End: 2019-09-06 | Stop reason: HOSPADM

## 2019-09-06 RX ORDER — PROPOFOL 10 MG/ML
VIAL (ML) INTRAVENOUS
Status: COMPLETED
Start: 2019-09-06 | End: 2019-09-06

## 2019-09-06 RX ORDER — LIDOCAINE HYDROCHLORIDE 20 MG/ML
INJECTION, SOLUTION EPIDURAL; INFILTRATION; INTRACAUDAL; PERINEURAL
Status: DISCONTINUED
Start: 2019-09-06 | End: 2019-09-06 | Stop reason: HOSPADM

## 2019-09-06 RX ORDER — PHENYLEPHRINE HYDROCHLORIDE 10 MG/ML
INJECTION INTRAVENOUS
Status: DISCONTINUED | OUTPATIENT
Start: 2019-09-06 | End: 2019-09-06

## 2019-09-06 RX ORDER — GLYCOPYRROLATE 0.2 MG/ML
INJECTION INTRAMUSCULAR; INTRAVENOUS
Status: DISCONTINUED
Start: 2019-09-06 | End: 2019-09-06 | Stop reason: HOSPADM

## 2019-09-06 RX ORDER — GLYCOPYRROLATE 0.2 MG/ML
INJECTION INTRAMUSCULAR; INTRAVENOUS
Status: DISCONTINUED | OUTPATIENT
Start: 2019-09-06 | End: 2019-09-06

## 2019-09-06 RX ORDER — SODIUM CHLORIDE 0.9 % (FLUSH) 0.9 %
10 SYRINGE (ML) INJECTION
Status: DISCONTINUED | OUTPATIENT
Start: 2019-09-06 | End: 2019-09-06 | Stop reason: HOSPADM

## 2019-09-06 RX ORDER — HYDROMORPHONE HYDROCHLORIDE 2 MG/ML
0.2 INJECTION, SOLUTION INTRAMUSCULAR; INTRAVENOUS; SUBCUTANEOUS EVERY 5 MIN PRN
Status: DISCONTINUED | OUTPATIENT
Start: 2019-09-06 | End: 2019-09-06 | Stop reason: HOSPADM

## 2019-09-06 RX ORDER — LIDOCAINE HCL/PF 100 MG/5ML
SYRINGE (ML) INTRAVENOUS
Status: DISCONTINUED | OUTPATIENT
Start: 2019-09-06 | End: 2019-09-06

## 2019-09-06 RX ADMIN — PROPOFOL 20 MG: 10 INJECTION, EMULSION INTRAVENOUS at 08:09

## 2019-09-06 RX ADMIN — GLYCOPYRROLATE 0.1 MG: 0.2 INJECTION, SOLUTION INTRAMUSCULAR; INTRAVENOUS at 09:09

## 2019-09-06 RX ADMIN — PROPOFOL 80 MG: 10 INJECTION, EMULSION INTRAVENOUS at 08:09

## 2019-09-06 RX ADMIN — PROPOFOL 20 MG: 10 INJECTION, EMULSION INTRAVENOUS at 09:09

## 2019-09-06 RX ADMIN — LIDOCAINE HYDROCHLORIDE 100 MG: 20 INJECTION, SOLUTION INTRAVENOUS at 08:09

## 2019-09-06 RX ADMIN — PHENYLEPHRINE HYDROCHLORIDE 50 MCG: 10 INJECTION INTRAVENOUS at 09:09

## 2019-09-06 RX ADMIN — SODIUM CHLORIDE: 0.9 INJECTION, SOLUTION INTRAVENOUS at 08:09

## 2019-09-06 NOTE — TRANSFER OF CARE
"Anesthesia Transfer of Care Note    Patient: Makayla Ferrer    Procedure(s) Performed: Procedure(s) (LRB):  COLONOSCOPY (N/A)    Patient location: GI    Anesthesia Type: MAC    Transport from OR: Transported from OR on room air with adequate spontaneous ventilation    Post pain: adequate analgesia    Post assessment: no apparent anesthetic complications    Post vital signs: stable    Level of consciousness: awake    Nausea/Vomiting: no nausea/vomiting    Complications: none    Transfer of care protocol was followed      Last vitals:   Visit Vitals  /60 (BP Location: Right arm, Patient Position: Lying)   Pulse 70   Temp 36.4 °C (97.6 °F) (Oral)   Resp 16   Ht 5' 3" (1.6 m)   Wt 44 kg (97 lb)   SpO2 99%   BMI 17.18 kg/m²     "

## 2019-09-06 NOTE — ANESTHESIA POSTPROCEDURE EVALUATION
Anesthesia Post Evaluation    Patient: Makayla Ferrer    Procedure(s) Performed: Procedure(s) (LRB):  COLONOSCOPY (N/A)    Final Anesthesia Type: general  Patient location during evaluation: PACU  Patient participation: Yes- Able to Participate  Level of consciousness: awake and alert, oriented and awake  Post-procedure vital signs: reviewed and stable  Pain management: adequate  Airway patency: patent  PONV status at discharge: No PONV  Anesthetic complications: no      Cardiovascular status: blood pressure returned to baseline, hemodynamically stable and stable  Respiratory status: unassisted and spontaneous ventilation  Hydration status: euvolemic  Follow-up not needed.          Vitals Value Taken Time   /76 9/6/2019  9:45 AM   Temp 36.4 °C (97.6 °F) 9/6/2019  9:15 AM   Pulse 72 9/6/2019  9:45 AM   Resp 20 9/6/2019  9:45 AM   SpO2 100 % 9/6/2019  9:45 AM         Event Time     Out of Recovery 09:49:11          Pain/Jaret Score: Jaret Score: 10 (9/6/2019  9:45 AM)  Modified Jaret Score: 20 (9/6/2019  9:45 AM)

## 2019-09-06 NOTE — PROVATION PATIENT INSTRUCTIONS
Discharge Summary/Instructions after an Endoscopic Procedure  Patient Name: Makayla Ferrer  Patient MRN: [id number]  Patient YOB: 1955  Friday, September 06, 2019  Elenita Bean MD  RESTRICTIONS:  During your procedure today, you received medications for sedation.  These   medications may affect your judgment, balance and coordination.  Therefore,   for 24 hours, you have the following restrictions:   - DO NOT drive a car, operate machinery, make legal/financial decisions,   sign important papers or drink alcohol.    ACTIVITY:  Today: no heavy lifting, straining or running due to procedural   sedation/anesthesia.  The following day: return to full activity including work.  DIET:  Eat and drink normally unless instructed otherwise.     TREATMENT FOR COMMON SIDE EFFECTS:  - Mild abdominal pain, nausea, belching, bloating or excessive gas:  rest,   eat lightly and use a heating pad.  - Sore Throat: treat with throat lozenges and/or gargle with warm salt   water.  - Because air was used during the procedure, expelling large amounts of air   from your rectum or belching is normal.  - If a bowel prep was taken, you may not have a bowel movement for 1-3 days.    This is normal.  SYMPTOMS TO WATCH FOR AND REPORT TO YOUR PHYSICIAN:  1. Abdominal pain or bloating, other than gas cramps.  2. Chest pain.  3. Back pain.  4. Signs of infection such as: chills or fever occurring within 24 hours   after the procedure.  5. Rectal bleeding, which would show as bright red, maroon, or black stools.   (A tablespoon of blood from the rectum is not serious, especially if   hemorrhoids are present.)  6. Vomiting.  7. Weakness or dizziness.  GO DIRECTLY TO THE NEAREST EMERGENCY ROOM IF YOU HAVE ANY OF THE FOLLOWING:      Difficulty breathing              Chills and/or fever over 101 F   Persistent vomiting and/or vomiting blood   Severe abdominal pain   Severe chest pain   Black, tarry stools   Bleeding- more than one  tablespoon   Any other symptom or condition that you feel may need urgent attention  Your doctor recommends these additional instructions:  If any biopsies were taken, your doctors clinic will contact you in 1 to 2   weeks with any results.  - Discharge patient to home.   - Repeat colonoscopy in 10 years for screening purposes.  For questions, problems or results please call your physician - Elenita Bean MD at Work:  [phone].  [facility] can be reached at [phone]     IF A COMPLICATION OR EMERGENCY SITUATION ARISES AND YOU ARE UNABLE TO REACH   YOUR PHYSICIAN - GO DIRECTLY TO THE EMERGENCY ROOM.  Elenita Bean MD  9/6/2019 9:20:07 AM  This report has been verified and signed electronically.  [employer]

## 2019-09-06 NOTE — ANESTHESIA PREPROCEDURE EVALUATION
09/06/2019  Makayla Ferrer is a 64 y.o., female.    Anesthesia Evaluation         Review of Systems  Anesthesia Hx:  No previous Anesthesia   Social:  Non-Smoker    Hematology/Oncology:  Hematology Normal   Oncology Normal   Oncology Comments: itp     EENT/Dental:EENT/Dental Normal   Cardiovascular:  Cardiovascular Normal     Pulmonary:  Pulmonary Normal    Renal/:  Renal/ Normal     Hepatic/GI:  Hepatic/GI Normal    Musculoskeletal:  Musculoskeletal Normal    Neurological:   Headaches    Endocrine:  Endocrine Normal    Dermatological:  Skin Normal    Psych:  Psychiatric Normal           Physical Exam  General:  Well nourished    Airway/Jaw/Neck:  Airway Findings: Mallampati: II TM Distance: < 4 cm      Dental:  DENTAL FINDINGS: Normal   Chest/Lungs:  Chest/Lungs Clear    Heart/Vascular:  Heart Findings: Normal       Mental Status:  Mental Status Findings:  Cooperative, Alert and Oriented         Anesthesia Plan  Type of Anesthesia, risks & benefits discussed:  Anesthesia Type:  general  Patient's Preference:   Intra-op Monitoring Plan: standard ASA monitors  Intra-op Monitoring Plan Comments:   Post Op Pain Control Plan: multimodal analgesia, IV/PO Opioids PRN and per primary service following discharge from PACU  Post Op Pain Control Plan Comments:   Induction:    Beta Blocker:  Patient is not currently on a Beta-Blocker (No further documentation required).       Informed Consent: Patient understands risks and agrees with Anesthesia plan.  Questions answered. Anesthesia consent signed with patient.  ASA Score: 3     Day of Surgery Review of History & Physical:    H&P update referred to the provider.  H&P completed by Anesthesiologist.   Anesthesia Plan Notes: npo        Ready For Surgery From Anesthesia Perspective.

## 2019-09-06 NOTE — H&P
Chief Complaint: Other (diarrhea,constipation)     HPI Seeking another opinion. Was diagnosed and treated for Lymphocytic colitis and completed a 3 months course of Entocort in September 2018. She continues to have diarrhea and constipation with certain food, She has lost 30 pounds in the last one year. She denies nausea or vomiting.  Review of Systems   Constitutional: Negative for fever and appetite change.   HENT: Negative for sore throat, trouble swallowing and neck pain.   Eyes: Negative for photophobia and visual disturbance.   Respiratory: Negative for wheezing.   Cardiovascular: Negative for chest pain and palpitations.   Gastrointestinal: See HPI for details   Musculoskeletal: Negative for joint swelling and arthralgias.   Skin: Negative for rash and wound.   Neurological: Negative for dizziness, tremors and weakness.   Hematological: Negative.   Psychiatric/Behavioral: Negative for suicidal ideas and behavioral problems.     Past Medical History:   Diagnosis Date    Allergic rhinitis     Basal cell carcinoma     Constipation     ITP (idiopathic thrombocytopenic purpura) 06/2015    Microscopic colitis     Migraines     Osteoporosis     Plantar fasciitis, bilateral        Past Surgical History:   Procedure Laterality Date    TONSILLECTOMY, ADENOIDECTOMY         Family History   Problem Relation Age of Onset    Hypertension Unknown     Melanoma Unknown     Thyroid disease Mother     Migraines Neg Hx     Asthma Neg Hx     Cancer Neg Hx        Social History     Socioeconomic History    Marital status:      Spouse name: Not on file    Number of children: Not on file    Years of education: Not on file    Highest education level: Not on file   Occupational History    Not on file   Social Needs    Financial resource strain: Not on file    Food insecurity:     Worry: Not on file     Inability: Not on file    Transportation needs:     Medical: Not on file     Non-medical: Not on file    Tobacco Use    Smoking status: Never Smoker    Smokeless tobacco: Never Used   Substance and Sexual Activity    Alcohol use: Yes     Comment: once a week    Drug use: Never    Sexual activity: Yes     Partners: Male   Lifestyle    Physical activity:     Days per week: Not on file     Minutes per session: Not on file    Stress: Not on file   Relationships    Social connections:     Talks on phone: Not on file     Gets together: Not on file     Attends Yarsanism service: Not on file     Active member of club or organization: Not on file     Attends meetings of clubs or organizations: Not on file     Relationship status: Not on file   Other Topics Concern    Not on file   Social History Narrative    Not on file       Current Facility-Administered Medications   Medication Dose Route Frequency Provider Last Rate Last Dose    0.9%  NaCl infusion   Intravenous Continuous Ferdinand Joel MD        lidocaine (PF) 10 mg/ml (1%) injection 10 mg  1 mL Intradermal Once Ferdinand Joel MD        lidocaine (PF) 20 mg/mL (2%) 20 mg/mL (2 %) injection             propofol (DIPRIVAN) 10 mg/mL IVP injection                Review of patient's allergies indicates:   Allergen Reactions    Shellfish containing products Hives and Swelling    Alendronate     Cephalexin     Fish containing products Hives     Objective:      Vitals:    09/06/19 0758   BP: (!) 107/56   Pulse: 61   Resp: 20   Temp: 97.7 °F (36.5 °C)     Physical Exam   Constitutional: Patient is oriented to person, place, and time. Appears well-nourished.   HENT:   Mouth/Throat: Oropharynx is clear and moist.   Eyes: Pupils are equal, round, and reactive to light.   Neck: Neck supple.   Cardiovascular: Normal heart sounds.   Pulmonary/Chest: Effort normal and breath sounds normal.   Abdominal: Soft. Exhibits no mass. There is no tenderness. There is no guarding.   Musculoskeletal: Normal range of motion.   Lymphadenopathy: Has no cervical adenopathy.    Neurological:Alert and oriented to person, place, and time.   Skin: Skin is warm. No rash noted.   Psychiatric: Has a normal mood and affect.     Assessment:  History of Lymphocytic Colitis    Plan:  Colonoscopy       I have reviewed the patient's medical history in detail and updated the computerized patient record

## 2019-09-18 ENCOUNTER — TELEPHONE (OUTPATIENT)
Dept: SURGERY | Facility: CLINIC | Age: 64
End: 2019-09-18

## 2019-09-18 NOTE — TELEPHONE ENCOUNTER
Pt was calling for pathology results from colonoscopy.  I will discuss with Dr. Bean in the morning and get back with patient.            ----- Message from Angely Peters sent at 9/18/2019  1:28 PM CDT -----  Contact: Patient   Type:  Test Results    Who Called: Patient     Name of Test (Lab/Mammo/Etc): Lab results    Date of Test: 9/6/2019    Ordering Provider: Dr. Bean    Where the test was performed: [facility]      Would the patient rather a call back or a response via [facility]? Call back     Best Call Back Number: [phone]

## 2019-09-19 ENCOUNTER — PATIENT MESSAGE (OUTPATIENT)
Dept: GASTROENTEROLOGY | Facility: CLINIC | Age: 64
End: 2019-09-19

## 2019-09-19 RX ORDER — BUDESONIDE 3 MG/1
6 CAPSULE, COATED PELLETS ORAL DAILY
Qty: 60 CAPSULE | Refills: 2 | Status: SHIPPED | OUTPATIENT
Start: 2019-09-19 | End: 2019-09-24 | Stop reason: SDUPTHER

## 2019-09-19 NOTE — TELEPHONE ENCOUNTER
Had a lengthy conversation with patient regarding pathology report.  Pathology report probably partially treated microscopic colitis.  We will commence on a course of Budesonide 6 mg. Side effects discussed.  Patient enquired about dietary restriction; none recommended.  OK to take Flu vaccine.  Check Herpes Vaccine recommendation with PCP but OK to defer until after Budesonide treatment.    All other questions answered.

## 2019-09-24 ENCOUNTER — TELEPHONE (OUTPATIENT)
Dept: SURGERY | Facility: CLINIC | Age: 64
End: 2019-09-24

## 2019-10-02 RX ORDER — BUDESONIDE 3 MG/1
CAPSULE, COATED PELLETS ORAL
Qty: 90 CAPSULE | Refills: 2 | Status: SHIPPED | OUTPATIENT
Start: 2019-10-02 | End: 2019-10-25 | Stop reason: SDUPTHER

## 2019-10-09 ENCOUNTER — PATIENT MESSAGE (OUTPATIENT)
Dept: SURGERY | Facility: CLINIC | Age: 64
End: 2019-10-09

## 2019-10-09 NOTE — TELEPHONE ENCOUNTER
From: Eliz Ferrer <[email or web address]>  Sent: Wednesday, October 9, 2019 11:39 AM  To: Molly Awan  Subject: [EXTERNAL] Questions about Budesonide    THIS EMAIL IS FROM AN EXTERNAL SENDER!  DO YOU TRUST THIS EMAIL?  If you are unsure, remember to use the Report Suspicious Email button in [facility] to send to [employer] [facility] for review. DO NOT click any links and NEVER input your username and password.    Dear Dr. Bean,  Since this is my second time taking budesonide for several months, I want to be careful what additional prescription medicines and supplements I use. As we discussed, I am sensitive to medications so I prefer to use them only when absolutely necessary and if needed use them in very small amounts. I value your advice and will follow your directions. Please respond to the drugs listed below letting me know what you are comfortable with me using? I have been taking 2 capsules of budesonide in the morning for 10 days now.  1. I have difficulty sleeping and budesonide has increased this problem. Before starting the budesonide, I fell asleep easily, sleeping for about 5 to 6 hours. Upon waking to urinate I then could not get back to sleep. Zaleplon (5mg, as needed) was prescribed 2 years ago and I have tried hard not to use it every night, typically using it 1 to 2 nights a week. Since starting the second round of budesonide, I still fall asleep quickly but I wake up after 2 to 3 hours, which is earlier than normal, to urinate and then I cannot get back to sleep. Consequently, I have used the zapelon almost every night. Do you recommend this or is there something else you would like me to try? Also should the dose be every night or every other night?  2. Per your directions, I am adding new foods back slowly. So either these new foods or the budesonide have increased my belching, more than before starting this second round. My diarrhea has not been frequent nor so urgent that I soil myself. Your thoughts  please on using Beano containing Alpha-galactosidase enzyme 800 GALU before meals and Gas Relief containing simethicone 125 mg as needed.  3. I have been only able to add 1/4 cup serving of fruit plus a couple of dates so I am not getting enough Vitamin C. Your opinion on taking 1000 mg a day of Vitamin C with Carole Hips and Iron Bioflavonoid Complex? If you approve, should it be taken at a different time from the budesonide?  4. For years, I have been on Vitamin D3 25mcg which is 1000IU for my osteoporosis. I assume this is OK? Also do you know of a calcium citrate supplement that your patients are having success with? What dose and introduction start date?    I appreciate your time. Thank you, Eliz    Sent from my iPad

## 2019-10-14 ENCOUNTER — TELEPHONE (OUTPATIENT)
Dept: SURGERY | Facility: CLINIC | Age: 64
End: 2019-10-14

## 2019-10-14 NOTE — TELEPHONE ENCOUNTER
Pt notified of appt with  12/5/19 @ 10:30am        ----- Message from Farheen Montgomery sent at 10/14/2019  3:38 PM CDT -----  Contact: Self  Type: Patient Call Back    Who called:Self    What is the request in detail: She was instructed to schedule within 8 weeks to f/u, but next available apt is 01/30/2019    Can the clinic reply by [facility]?No    Would the patient rather a call back or a response via [facility]? Callback    Best call back number:[phone]

## 2019-10-25 RX ORDER — BUDESONIDE 3 MG/1
CAPSULE, COATED PELLETS ORAL
Qty: 60 CAPSULE | Refills: 0 | Status: SHIPPED | OUTPATIENT
Start: 2019-10-25 | End: 2021-10-03

## 2019-12-05 ENCOUNTER — OFFICE VISIT (OUTPATIENT)
Dept: GASTROENTEROLOGY | Facility: CLINIC | Age: 64
End: 2019-12-05
Payer: MEDICARE

## 2019-12-05 VITALS
WEIGHT: 97 LBS | DIASTOLIC BLOOD PRESSURE: 67 MMHG | HEIGHT: 63 IN | BODY MASS INDEX: 17.19 KG/M2 | SYSTOLIC BLOOD PRESSURE: 100 MMHG | HEART RATE: 64 BPM

## 2019-12-05 DIAGNOSIS — K52.832 LYMPHOCYTIC COLITIS: ICD-10-CM

## 2019-12-05 DIAGNOSIS — R62.7 POOR WEIGHT GAIN IN ADULT: Primary | ICD-10-CM

## 2019-12-05 DIAGNOSIS — D52.0 DIETARY FOLATE DEFICIENCY ANEMIA: ICD-10-CM

## 2019-12-05 DIAGNOSIS — K58.0 IRRITABLE BOWEL SYNDROME WITH DIARRHEA: ICD-10-CM

## 2019-12-05 DIAGNOSIS — K52.832 LYMPHOCYTIC COLITIS: Primary | ICD-10-CM

## 2019-12-05 PROCEDURE — 99214 OFFICE O/P EST MOD 30 MIN: CPT | Mod: S$GLB,,, | Performed by: INTERNAL MEDICINE

## 2019-12-05 PROCEDURE — 99214 PR OFFICE/OUTPT VISIT, EST, LEVL IV, 30-39 MIN: ICD-10-PCS | Mod: S$GLB,,, | Performed by: INTERNAL MEDICINE

## 2019-12-06 NOTE — PROGRESS NOTES
Subjective:       Patient ID: Makayla Ferrer is a 64 y.o. female.    Chief Complaint: Follow-up (Pt states that her weight gain has slown down, and she is having less diarrhea. )     Follow-up   Pertinent negatives include no coughing, headaches, rash or weakness.    The patient was diagnosis with lymphocytic colitis.  And had been on a tapering course of Budesonide.  Currently she is on 3 mg every other day.  She has 1 more month of this does.    Patient reports that overall had diarrhea is markedly improved.  Abdominal pain had resolved.  She is however frustrated with lack of weight gain.  Patient came with her event and activity log.  She is here today with her  and they both have multiple questions.  We went over her diet and it appears that patient is on a high-protein diet with very little carbs.  By my estimate this looks like 1500 calories per day.  She is also on an exercise regimen.  We discuss increasing her overall caloric intake and also introducing some carbohydrate.     Review of Systems   Constitutional: Negative for appetite change.   HENT: Negative for trouble swallowing.    Eyes: Negative for photophobia.   Respiratory: Negative for cough and shortness of breath.    Cardiovascular: Negative for palpitations.   Gastrointestinal:        See HPI for details   Genitourinary: Negative for frequency and hematuria.   Skin: Negative for rash.   Neurological: Negative for weakness and headaches.   Psychiatric/Behavioral: Negative for behavioral problems and suicidal ideas.       Objective:      Physical Exam   Eyes: Pupils are equal, round, and reactive to light.   Neck: No thyromegaly present.   Cardiovascular: Normal rate, regular rhythm and normal heart sounds.   Pulmonary/Chest: Effort normal and breath sounds normal.   Abdominal: Soft. She exhibits no mass. There is no tenderness. There is no rebound and no guarding.   Musculoskeletal: She exhibits no tenderness.   Psychiatric: Her  behavior is normal. Thought content normal.       Assessment:       1. Lymphocytic colitis        Plan:       Makayla was seen today for follow-up.    Diagnoses and all orders for this visit:    Lymphocytic colitis     Refer to dietitian.   Discontinue budesonide in the month    Return to clinic p.r.n.

## 2019-12-10 ENCOUNTER — PATIENT MESSAGE (OUTPATIENT)
Dept: SURGERY | Facility: CLINIC | Age: 64
End: 2019-12-10

## 2020-01-03 ENCOUNTER — TELEPHONE (OUTPATIENT)
Dept: ORTHOPEDICS | Facility: CLINIC | Age: 65
End: 2020-01-03

## 2020-01-03 NOTE — TELEPHONE ENCOUNTER
Spoke with patient regarding an appointment in the fracture clinic , she didn,t know what day to schedule because of her  schedule, patient will call back to schedule

## 2020-01-03 NOTE — TELEPHONE ENCOUNTER
----- Message from Rita Rdz LPN sent at 1/3/2020  2:26 PM CST -----  Contact: Self/[phone]      ----- Message -----  From: Jovita Dominguez MA  Sent: 1/3/2020   2:12 PM CST  To: [facility] Fracture Care Clinical Support    Pt stated that she was seeing someone in the  area and moved but is looking to see a specialist that deal with osteoporosis etc... Pt is requesting a call back form Fracture Care in reference to  a few details on what she needs to do to be seen.

## 2020-01-06 ENCOUNTER — TELEPHONE (OUTPATIENT)
Dept: ORTHOPEDICS | Facility: CLINIC | Age: 65
End: 2020-01-06

## 2020-01-06 NOTE — TELEPHONE ENCOUNTER
Tried contacting patient regarding an appointment in the fracture clinic no answer left message on voice mail

## 2020-01-15 ENCOUNTER — TELEPHONE (OUTPATIENT)
Dept: ENDOSCOPY | Facility: HOSPITAL | Age: 65
End: 2020-01-15

## 2020-01-16 ENCOUNTER — PATIENT MESSAGE (OUTPATIENT)
Dept: ENDOSCOPY | Facility: HOSPITAL | Age: 65
End: 2020-01-16

## 2020-01-27 ENCOUNTER — NUTRITION (OUTPATIENT)
Dept: GASTROENTEROLOGY | Facility: CLINIC | Age: 65
End: 2020-01-27

## 2020-01-27 VITALS — BODY MASS INDEX: 17.85 KG/M2 | WEIGHT: 100.75 LBS | HEIGHT: 63 IN

## 2020-01-27 DIAGNOSIS — K58.0 IRRITABLE BOWEL SYNDROME WITH DIARRHEA: ICD-10-CM

## 2020-01-27 DIAGNOSIS — K52.832 LYMPHOCYTIC COLITIS: Primary | ICD-10-CM

## 2020-01-27 PROCEDURE — 99999 PR PBB SHADOW E&M-EST. PATIENT-LVL II: CPT | Mod: PBBFAC,,,

## 2020-01-27 PROCEDURE — 99999 PR PBB SHADOW E&M-EST. PATIENT-LVL II: ICD-10-PCS | Mod: PBBFAC,,,

## 2020-01-27 NOTE — PROGRESS NOTES
"  GI NUTRITIONAL ASSESSMENT  Referring Provider: Dr Vikas Benavides Roberto Ferrer is a 64 y.o. female referred to  regarding the following problems:  Reason for Visit: Nutrition assessment and recommendations related to:   Chief Complaint   Patient presents with    Lymphocytic colitis     finishing up steroid medication ; avoids nuts and beans     ITP     Low platelet count     Osteoporosis     uses Lactaid milk        Patient Nutrition Goals :   Improvement of abdominal pain, gas and diarrhea with better food choices     Medical/Surgical History  Pertinent Social History:  Social History     Tobacco Use    Smoking status: Never Smoker    Smokeless tobacco: Never Used   Substance Use Topics    Alcohol use: Yes     Comment: once a week    Drug use: Never        Previous Medical History:  Past Medical History:   Diagnosis Date    Allergic rhinitis     Basal cell carcinoma     Constipation     ITP (idiopathic thrombocytopenic purpura) 06/2015    Microscopic colitis     Migraines     Osteoporosis     Plantar fasciitis, bilateral        Previous Surgical History:  Past Surgical History:   Procedure Laterality Date    COLONOSCOPY N/A 9/6/2019    Procedure: COLONOSCOPY;  Surgeon: Elenita Bean MD;  Location: [facility];  Service: Endoscopy;  Laterality: N/A;    TONSILLECTOMY, ADENOIDECTOMY        Anthropometric Data Usual Weight:   has increased 4 pounds over last 6 months   Estimated body mass index is 17.85 kg/m² as calculated from the following:    Height as of this encounter: 5' 3" (1.6 m).    Weight as of this encounter: 45.7 kg (100 lb 12 oz).    Weight History:  Wt Readings from Last 12 Encounters:   01/27/20 45.7 kg (100 lb 12 oz)   12/05/19 44 kg (97 lb)   09/06/19 44 kg (97 lb)   07/26/19 44.3 kg (97 lb 12.4 oz)   06/03/19 43 kg (94 lb 12.8 oz)   11/20/18 49.4 kg (108 lb 14.5 oz)   09/11/18 50 kg (110 lb 3.7 oz)   05/30/18 52.8 kg (116 lb 6.5 oz)   10/31/17 55.4 kg (122 lb 2.2 oz) "   06/01/17 54 kg (119 lb 0.8 oz)   01/31/17 53 kg (116 lb 13.5 oz)   09/12/16 51.8 kg (114 lb 3.2 oz)   ]    BMI: Body mass index is 17.85 kg/m².  Calculated calorie needs :   Calculated Protein needs :  Nutrition Focused Physical Findings:   Well Nourished. No Malnutrition.    Body Fat Depletion: Loss of subcutaneous fat in orbital, triceps, ribs.  Muscle Mass Depletion: Temples (temporalis), clavicles (pectoralis & deltoids), shoulder (deltoids), interosseous muscles, scapula (latissimus dorsi, trapezious, deltoids), thighs (quadriceps) and calf (gastrocnemius)  Fluid Accumulation:     Meds  and Biochemical Data   Labs  Lab Results   Component Value Date    GLU 85 06/03/2019    K 4.1 06/03/2019    CHOL 171 10/16/2007    HDL 72 (H) 10/16/2007    TRIG 63 10/16/2007    ALBUMIN 4.8 10/16/2007    CALCIUM 10.3 06/03/2019      Current Outpatient Medications   Medication Sig    budesonide (ENTOCORT EC) 3 mg capsule TAKE 2 CAPSULES BY MOUTH ONCE DAILY (1ST MONTH)    cholecalciferol, vitamin D3, 2,000 unit Cap Take 1 capsule by mouth once daily.    FLAXSEED OIL ORAL Take 1 tablet by mouth once daily.     propylene glycol-glycerin (SOOTHE LUBRICANT) 0.6-0.6 % Dpet Apply to eye once daily.    white petrolatum-mineral oil 80-20 % Oint Apply to eye every evening.    zaleplon (SONATA) 5 MG Cap Take 5 mg by mouth as needed.     No current facility-administered medications for this visit.      Lab Results   Component Value Date    WBC 5.46 10/14/2008    HGB 13.6 10/14/2008    HCT 41.5 10/14/2008    MCV 91.0 10/14/2008     (L) 10/14/2008     No results found for: FERRITIN  Lab Results   Component Value Date     06/03/2019    K 4.1 06/03/2019     06/03/2019    CO2 26 06/03/2019    GLU 85 06/03/2019    BUN 31 (H) 06/03/2019    CREATININE 1.1 06/03/2019    CALCIUM 10.3 06/03/2019    PROT 6.9 10/16/2007    ALBUMIN 4.8 10/16/2007    BILITOT 0.5 10/16/2007    ALKPHOS 84 10/16/2007    AST 17 10/16/2007    ALT 12  10/16/2007     Lab Results   Component Value Date    TSH 2.5 10/16/2007     No results found for: SEDRATE  No results found for: CRP  No results found for: LABA1C, HGBA1C  Glucose   Date Value Ref Range Status   06/03/2019 85 70 - 110 mg/dL Final   10/16/2007 84 70 - 110 mg/dl Final     BUN, Bld   Date Value Ref Range Status   06/03/2019 31 (H) 8 - 23 mg/dL Final   10/16/2007 20 5 - 23 mg/dl Final     Creatinine   Date Value Ref Range Status   06/03/2019 1.1 0.5 - 1.4 mg/dL Final   10/16/2007 0.8 0.5 - 1.4 mg/dl Final     Sodium   Date Value Ref Range Status   06/03/2019 140 136 - 145 mmol/L Final   10/16/2007 142 136 - 145 mMol/l Final     Potassium   Date Value Ref Range Status   06/03/2019 4.1 3.5 - 5.1 mmol/L Final   10/16/2007 4.2 3.3 - 5.3 mMol/l Final     No results found for: PHOS  Calcium   Date Value Ref Range Status   06/03/2019 10.3 8.7 - 10.5 mg/dL Final   10/16/2007 9.6 8.7 - 10.5 mg/dl Final     No results found for: PREALBUMIN  Albumin   Date Value Ref Range Status   10/16/2007 4.8 3.5 - 5.2 g/dl Final   02/01/2005 4.8 3.5 - 5.2 g/dl Final     Comment:     Please note new adult reference range effective 08/11/04.     Total Protein   Date Value Ref Range Status   10/16/2007 6.9 6.0 - 8.4 gm/dl Final   02/01/2005 7.2 6.0 - 8.4 gm/dl Final     Cholesterol   Date Value Ref Range Status   10/16/2007 171 120 - 199 mg/dL Final     Comment:     The National Cholesterol Education Program (NCEP) has set the  following guidelines (reference ranges) for Cholesterol:  Desirable...................<200 mg/dL  Borderline High.............200-239 mg/dL  High........................> or = 240 mg/dL     02/01/2005 173 120 - 199 mg/dL Final     Comment:     The National Cholesterol Education Program (NCEP) has set the  following guidelines (reference ranges) for Cholesterol:  Desirable...................<200 mg/dL  Borderline High.............200-239 mg/dL  High........................> or = 240 mg/dL       No results  found for: HGBA1C  Hemoglobin   Date Value Ref Range Status   10/14/2008 13.6 12.0 - 16.0 gm/dl Final   10/16/2007 13.6 12.0 - 16.0 gm/dl Final     Hematocrit   Date Value Ref Range Status   10/14/2008 41.5 37.0 - 48.5 % Final   10/16/2007 39.8 37.0 - 48.5 % Final     No results found for: IRON  No results found for: FOLATE  WBC   Date Value Ref Range Status   10/14/2008 5.46 4.8 - 10.8 K/uL Final   10/16/2007 4.30 (L) 4.8 - 10.8 K/uL Final       No components found for: VZNPHPTJ27  No components found for: JEXYRKTB95  No components found for: VITAMIND2  No components found for: VITAMIND    Lab Results   Component Value Date    TSH 2.5 10/16/2007     No results found for: FERRITIN  No results found for: CRP  No results found for: SEDRATE  Assessment of Lab Values:       Medication:  Current Outpatient Medications   Medication Sig    budesonide (ENTOCORT EC) 3 mg capsule TAKE 2 CAPSULES BY MOUTH ONCE DAILY (1ST MONTH)    cholecalciferol, vitamin D3, 2,000 unit Cap Take 1 capsule by mouth once daily.    FLAXSEED OIL ORAL Take 1 tablet by mouth once daily.     propylene glycol-glycerin (SOOTHE LUBRICANT) 0.6-0.6 % Dpet Apply to eye once daily.    white petrolatum-mineral oil 80-20 % Oint Apply to eye every evening.    zaleplon (SONATA) 5 MG Cap Take 5 mg by mouth as needed.     No current facility-administered medications for this visit.      Nutritionally significant meds:  Vitamin/Supplements/Herbs: Vit C, Vit D, beano , flaxseed oil   Potential Food drug Interaction:   Allergies:  Review of patient's allergies indicates:   Allergen Reactions    Shellfish containing products Hives and Swelling    Alendronate     Cephalexin     Fish containing products Hives      Dietary Data  Current Diet: Has followed specific cho diet   Breakfast and mid morning snack:   1 egg with spinach, tomatoes, cooked with olive oil or coconut oil   Ham or chicken   Or 1 serving oatmeal with honey   Snack: Fruit  Orange, ingrid,  blueberries, strawberries, raspberries, pineapple, cherries, cantaloupe, grapes   Lunch: Arugula, butter lettuce, carrots, tomatoes, bell pepper with dressing:  (basil, flaxseed oil, sofia mustard, thyme); ham or chicken, fruit   Snack: dates with coconut   Supper: pork tenderloin, chicken , hamburger, beef stew or steak ; butternut squash, , yellow squash, eggplant, zucchini ; sweet potatoes   Snack: dates with coconut     Meal patterns: 3 meals daily and 1-2 snacks daily  Appetite/Current Intake: good   75 - 100 %  Fluid Intake /quantity: water 24 oz   Nutrition beverages: mainly drinks water  Fruit: eats variety  Vegetables: eats variety   Meal preparation/shopping: self  Dining out: Infrequent, 1-2 times per week;  salad, Zoes , albasha   Fast Foods :none   Foods poorly tolerated : sugar, wheat, fructose, dairy , cabbage, onion, garlic   Milk tolerance : poor   Fiber tolerance : good   Veg Raw              Cooked   Fruit Raw             Cooked   Nuts/seeds: avoids   Fat tolerance : no problem   Sugars and desserts: triggers gas and bloating   Supplements/ MVI: see above   Review of patient's allergies indicates:   Allergen Reactions    Shellfish containing products Hives and Swelling    Alendronate     Cephalexin     Fish containing products Hives      Other Data:  Social : 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  Reviewed low fructose sugar fruits   Discussed role of cooking in water for foods such as cabbage, onions, garlic to purge fodmaps   Also discussed options such as use of immersion /crock/instapot to enhance digestibility      Patient and/or family comprehend instructions: yes  Outcome: Verbalizes understanding  Monitoring: symptoms; bowel  function ,weight   nutrition support tolerance    Follow-up: 3/9/20   Counseling Time: 60 minutes

## 2020-02-26 ENCOUNTER — PATIENT MESSAGE (OUTPATIENT)
Dept: GASTROENTEROLOGY | Facility: CLINIC | Age: 65
End: 2020-02-26

## 2020-02-28 DIAGNOSIS — M81.0 OSTEOPOROSIS, UNSPECIFIED OSTEOPOROSIS TYPE, UNSPECIFIED PATHOLOGICAL FRACTURE PRESENCE: Primary | ICD-10-CM

## 2020-03-09 ENCOUNTER — OFFICE VISIT (OUTPATIENT)
Dept: GASTROENTEROLOGY | Facility: CLINIC | Age: 65
End: 2020-03-09
Payer: MEDICARE

## 2020-03-09 ENCOUNTER — TELEPHONE (OUTPATIENT)
Dept: SURGERY | Facility: CLINIC | Age: 65
End: 2020-03-09

## 2020-03-09 ENCOUNTER — NUTRITION (OUTPATIENT)
Dept: GASTROENTEROLOGY | Facility: CLINIC | Age: 65
End: 2020-03-09

## 2020-03-09 VITALS — BODY MASS INDEX: 17.7 KG/M2 | HEIGHT: 63 IN | WEIGHT: 99.88 LBS

## 2020-03-09 VITALS
WEIGHT: 99 LBS | SYSTOLIC BLOOD PRESSURE: 112 MMHG | HEIGHT: 63 IN | DIASTOLIC BLOOD PRESSURE: 74 MMHG | BODY MASS INDEX: 17.54 KG/M2 | HEART RATE: 66 BPM

## 2020-03-09 DIAGNOSIS — K58.0 IRRITABLE BOWEL SYNDROME WITH DIARRHEA: ICD-10-CM

## 2020-03-09 DIAGNOSIS — K52.832 LYMPHOCYTIC COLITIS: ICD-10-CM

## 2020-03-09 DIAGNOSIS — R62.7 POOR WEIGHT GAIN IN ADULT: Primary | ICD-10-CM

## 2020-03-09 DIAGNOSIS — K52.832 LYMPHOCYTIC COLITIS: Primary | ICD-10-CM

## 2020-03-09 PROCEDURE — 99999 PR PBB SHADOW E&M-EST. PATIENT-LVL II: CPT | Mod: PBBFAC,,,

## 2020-03-09 PROCEDURE — 99214 PR OFFICE/OUTPT VISIT, EST, LEVL IV, 30-39 MIN: ICD-10-PCS | Mod: S$GLB,,, | Performed by: INTERNAL MEDICINE

## 2020-03-09 PROCEDURE — 99214 OFFICE O/P EST MOD 30 MIN: CPT | Mod: S$GLB,,, | Performed by: INTERNAL MEDICINE

## 2020-03-09 PROCEDURE — 99999 PR PBB SHADOW E&M-EST. PATIENT-LVL II: ICD-10-PCS | Mod: PBBFAC,,,

## 2020-03-09 NOTE — PROGRESS NOTES
Subjective:       Patient ID: Makayla Ferrer is a 64 y.o. female.    Chief Complaint: Follow-up (Pt states that she have finished the steroids and is seeing a dietician. Pt states that her symptoms have switched any thing she eats leaves her with the sypmtoms listed below. ); Bloated; Constipation; Gas; and Diarrhea (Pt states that the explosive diarrhea have decreased to once monthly.)    HPI Patient feels better overall. The diarrhea has significantly improved. There is no abdominal pain.  She is now complaining of constipation which is then followed up with q monthly diarrhea.  She feels very gassy.  She is very happy with her dietary plan and is following the pan and reading a book recommended by her dietitian.  She also started an exercise plan and trying to gain weight.     Review of Systems   Constitutional: Negative for appetite change.   HENT: Negative for trouble swallowing.    Eyes: Negative for photophobia.   Respiratory: Negative for cough and shortness of breath.    Cardiovascular: Negative for palpitations.   Gastrointestinal:        See HPI for details   Genitourinary: Negative for frequency and hematuria.   Skin: Negative for rash.   Neurological: Negative for weakness and headaches.   Psychiatric/Behavioral: Negative for behavioral problems and suicidal ideas.       Objective:       Vitals:    03/09/20 1016   BP: 112/74   Pulse: 66       Physical Exam   Eyes: Pupils are equal, round, and reactive to light.   Neck: No thyromegaly present.   Cardiovascular: Normal rate, regular rhythm and normal heart sounds.   Pulmonary/Chest: Effort normal and breath sounds normal.   Abdominal: Soft. She exhibits no mass. There is no tenderness. There is no rebound and no guarding.   Musculoskeletal: She exhibits no tenderness.   Psychiatric: Her behavior is normal. Thought content normal.       Assessment:       1. Poor weight gain in adult    2. Lymphocytic colitis    3. Irritable bowel syndrome with  diarrhea        Plan:       Makayla was seen today for follow-up, bloated, constipation, gas and diarrhea.    Diagnoses and all orders for this visit:    Poor weight gain in adult    Lymphocytic colitis    Irritable bowel syndrome with constipation and diarrhea      Continue dietary plan.  Docusate Sodium 50 mg po q day.  Probiotics    RTC PRN

## 2020-03-09 NOTE — PROGRESS NOTES
GI NUTRITIONAL Follow up    Seen initially 1/27/2020    Referring Provider: Dr Vikas Benavides Roberto Ferrer is a 64 y.o. female referred to  regarding the following problems:  Reason for Visit: Nutrition assessment and recommendations related to:   Chief Complaint   Patient presents with    Lymphocytic colitis     finishing up steroid medication ; avoids nuts and beans     ITP     Low platelet count     Osteoporosis     uses Lactaid milk        Patient Nutrition Goals :   Improvement of abdominal pain, gas and diarrhea with better food choices     Changes since last meeting :  Increased Physical activity at [facility] : recumbent bike , weights - increasing strength   Addition of foods /removal of high sugar fruits- dates, sweetened coconut which could be culprit in bloating.     Dietary Data  Current Diet: Changes since last meeting : eliminated cured meats, trying buffalo/bison ( lower fat content) ; increased fluid intake due to gum abcess; added grits and oatmeal , stopped dried fruit and has started berries ; added avocado .       Breakfast and mid morning snack:   1 egg with spinach, tomatoes, cooked with olive oil or coconut oil   Ham or chicken   Or 1 serving oatmeal with honey   Snack: Oatmeal   Lunch: Arugula, butter lettuce, carrots, tomatoes, bell pepper with dressing:  (basil, flaxseed oil, sofia mustard, thyme); ham or chicken, fruit   Snack: oatmeal with blueberries   Supper: chicken , grits, butternut squash, , yellow squash, eggplant, zucchini ; sweet potatoes       Meal patterns: 3 meals daily and 1-2 snacks daily  Appetite/Current Intake: good   75 - 100 %  Fluid Intake /quantity: water 24 oz   Nutrition beverages: mainly drinks water  Fruit: eats variety  Vegetables: eats variety   Meal preparation/shopping: self  Dining out: Infrequent, 1-2 times per week;  salad, Zoes , albasha   Fast Foods :none   Foods poorly tolerated : sugar, wheat, fructose, dairy , cabbage,  onion, garlic   Milk tolerance : poor   Fiber tolerance : good   Veg Raw              Cooked   Fruit Raw             Cooked   Nuts/seeds: avoids   Fat tolerance : no problem   Sugars and desserts: triggers gas and bloating   Supplements/ MVI: Align, vit D and Vit C   Review of patient's allergies indicates:   Allergen Reactions    Shellfish containing products Hives and Swelling    Alendronate     Cephalexin     Fish containing products Hives      Other Data:  Social : oversees care of mom in [place] in hospice ; commutes once per moth   Barriers: none identified  Spouse is primary support      Goals/Recommendations:   Has started to use Gut Balance Revolution by David Amaya MD to guide reintroduction of foods   Reviewed low Fodmap diet and Arielle Trevino 's low Fodmap/Specific cho diet range scale -- dates high in sugar/fructose  as is sweetened coconut and may be culprit in diarrhea.  Reviewed low fructose sugar fruits   Discussed role of cooking in water for foods such as cabbage, onions, garlic to purge fodmaps   Also discussed options such as use of immersion /crock/instapot to enhance digestibility      Patient and/or family comprehend instructions: yes  Outcome: Verbalizes understanding  Monitoring: symptoms; bowel function ,weight   nutrition support tolerance    Follow-up: feels she is in a good place with current guidelines and resources . She will call to set up another appt   Counseling Time: 60 minutes

## 2020-03-09 NOTE — TELEPHONE ENCOUNTER
Pt called to let us know she may be a few minutes late for her appt due to traffic.        ----- Message from Angely Peters sent at 3/9/2020  9:19 AM CDT -----  Contact: Patient   Type: Patient Call Back    Who called:Patient    What is the request in detail: Pt is calling to inform the office that she maybe late, she at the airport and there's an accident.     Can the clinic reply by [facility]?    Would the patient rather a call back or a response via [facility]? Call back     Best call back number: [phone]

## 2020-03-10 ENCOUNTER — TELEPHONE (OUTPATIENT)
Dept: ENDOCRINOLOGY | Facility: CLINIC | Age: 65
End: 2020-03-10

## 2020-03-10 NOTE — TELEPHONE ENCOUNTER
----- Message from Millie Landry sent at 3/10/2020  3:08 PM CDT -----  Contact: Self  Pt is calling to speak with Staff regarding being scheduled for an appt for her osteoporosis issue.  Pt has a referral in Epic, however,  was unable to find availability due to an exhausted template.    She can be reached at [phone].    Thank you.

## 2020-05-05 ENCOUNTER — PATIENT MESSAGE (OUTPATIENT)
Dept: INTERNAL MEDICINE | Facility: CLINIC | Age: 65
End: 2020-05-05

## 2020-08-31 ENCOUNTER — TELEPHONE (OUTPATIENT)
Dept: GASTROENTEROLOGY | Facility: CLINIC | Age: 65
End: 2020-08-31

## 2020-08-31 NOTE — TELEPHONE ENCOUNTER
Pt called she needs her pathology slides sent to [facility].  Pt instructed she would have to [facility] and they could tell her the process. Phone number given to pt.        ----- Message from Tori Tello sent at 8/31/2020  8:33 AM CDT -----  Regarding: pathology slides  Name of Who is Calling: JESUS ALBERTO JONAS [[id number]]      What is the request in detail: Patient is requesting a call back concerning pathology slides       Can the clinic reply by [facility]: no      [facility]: [phone]

## 2021-02-25 ENCOUNTER — APPOINTMENT (RX ONLY)
Dept: URBAN - METROPOLITAN AREA CLINIC 153 | Facility: CLINIC | Age: 66
Setting detail: DERMATOLOGY
End: 2021-02-25

## 2021-02-25 DIAGNOSIS — D22 MELANOCYTIC NEVI: ICD-10-CM

## 2021-02-25 DIAGNOSIS — L72.0 EPIDERMAL CYST: ICD-10-CM

## 2021-02-25 PROBLEM — D22.5 MELANOCYTIC NEVI OF TRUNK: Status: ACTIVE | Noted: 2021-02-25

## 2021-02-25 PROCEDURE — 99212 OFFICE O/P EST SF 10 MIN: CPT

## 2021-02-25 PROCEDURE — ? COUNSELING

## 2021-02-25 PROCEDURE — ? ADDITIONAL NOTES

## 2021-02-25 ASSESSMENT — LOCATION SIMPLE DESCRIPTION DERM
LOCATION SIMPLE: RIGHT UPPER BACK
LOCATION SIMPLE: RIGHT CHEEK

## 2021-02-25 ASSESSMENT — LOCATION ZONE DERM
LOCATION ZONE: TRUNK
LOCATION ZONE: FACE

## 2021-02-25 ASSESSMENT — LOCATION DETAILED DESCRIPTION DERM
LOCATION DETAILED: RIGHT CENTRAL MALAR CHEEK
LOCATION DETAILED: RIGHT MEDIAL UPPER BACK

## 2021-02-25 NOTE — PROCEDURE: ADDITIONAL NOTES
Additional Notes: Extracted with 11 blade and Qtips.
Detail Level: Simple
Render Risk Assessment In Note?: no

## 2021-06-03 ENCOUNTER — APPOINTMENT (RX ONLY)
Dept: URBAN - METROPOLITAN AREA CLINIC 153 | Facility: CLINIC | Age: 66
Setting detail: DERMATOLOGY
End: 2021-06-03

## 2021-06-03 PROBLEM — I10 ESSENTIAL (PRIMARY) HYPERTENSION: Status: ACTIVE | Noted: 2021-06-03

## 2021-06-03 PROBLEM — D48.5 NEOPLASM OF UNCERTAIN BEHAVIOR OF SKIN: Status: ACTIVE | Noted: 2021-06-03

## 2021-06-03 PROCEDURE — 11102 TANGNTL BX SKIN SINGLE LES: CPT

## 2021-06-03 PROCEDURE — ? BIOPSY BY SHAVE METHOD

## 2021-06-03 NOTE — PROCEDURE: BIOPSY BY SHAVE METHOD
Anesthesia Type: 1% lidocaine with epinephrine and a 1:10 solution of 8.4% sodium bicarbonate
Billing Type: Third-Party Bill
Render Post-Care Instructions In Note?: no
Cryotherapy Text: The wound bed was treated with cryotherapy after the biopsy was performed.
Hemostasis: Drysol
Post-Care Instructions: I reviewed with the patient in detail post-care instructions. Patient is to keep the biopsy site dry overnight, and then apply bacitracin twice daily until healed. Patient may apply hydrogen peroxide soaks to remove any crusting.
X Size Of Lesion In Cm: 0
Electrodesiccation Text: The wound bed was treated with electrodesiccation after the biopsy was performed.
Wound Care: Bacitracin
Silver Nitrate Text: The wound bed was treated with silver nitrate after the biopsy was performed.
Biopsy Method: Dermablade
Type Of Destruction Used: Curettage
Was A Bandage Applied: Yes
Consent: Written consent was obtained and risks were reviewed including but not limited to scarring, infection, bleeding, scabbing, incomplete removal, nerve damage and allergy to anesthesia.
Dressing: bandage
Detail Level: Detailed
Curettage Text: The wound bed was treated with curettage after the biopsy was performed.
Size Of Lesion In Cm: 0.8
Information: Selecting Yes will display possible errors in your note based on the variables you have selected. This validation is only offered as a suggestion for you. PLEASE NOTE THAT THE VALIDATION TEXT WILL BE REMOVED WHEN YOU FINALIZE YOUR NOTE. IF YOU WANT TO FAX A PRELIMINARY NOTE YOU WILL NEED TO TOGGLE THIS TO 'NO' IF YOU DO NOT WANT IT IN YOUR FAXED NOTE.
Anesthesia Volume In Cc: 0.5
Biopsy Type: H and E
Depth Of Biopsy: dermis
Notification Instructions: Patient will be notified of biopsy results. However, patient instructed to call the office if not contacted within 2 weeks.
Electrodesiccation And Curettage Text: The wound bed was treated with electrodesiccation and curettage after the biopsy was performed.

## 2021-10-08 PROBLEM — M81.0 OSTEOPOROSIS: Status: ACTIVE | Noted: 2021-10-08

## 2022-11-29 ENCOUNTER — APPOINTMENT (RX ONLY)
Dept: URBAN - METROPOLITAN AREA CLINIC 153 | Facility: CLINIC | Age: 67
Setting detail: DERMATOLOGY
End: 2022-11-29

## 2022-11-29 DIAGNOSIS — L57.0 ACTINIC KERATOSIS: ICD-10-CM

## 2022-11-29 PROBLEM — D48.5 NEOPLASM OF UNCERTAIN BEHAVIOR OF SKIN: Status: ACTIVE | Noted: 2022-11-29

## 2022-11-29 PROCEDURE — 11102 TANGNTL BX SKIN SINGLE LES: CPT

## 2022-11-29 PROCEDURE — ? BIOPSY BY SHAVE METHOD

## 2022-11-29 ASSESSMENT — LOCATION DETAILED DESCRIPTION DERM: LOCATION DETAILED: RIGHT SUPERIOR PARIETAL SCALP

## 2022-11-29 ASSESSMENT — LOCATION ZONE DERM: LOCATION ZONE: SCALP

## 2022-11-29 ASSESSMENT — LOCATION SIMPLE DESCRIPTION DERM: LOCATION SIMPLE: SCALP

## 2022-12-07 ENCOUNTER — RX ONLY (OUTPATIENT)
Age: 67
Setting detail: RX ONLY
End: 2022-12-07

## 2022-12-07 RX ORDER — IMIQUIMOD 12.5 MG/.25G
CREAM TOPICAL
Qty: 12 | Refills: 0 | Status: ERX | COMMUNITY
Start: 2022-12-07

## 2023-01-03 PROBLEM — D25.9 UTERINE LEIOMYOMA: Status: ACTIVE | Noted: 2023-01-03

## 2023-05-30 ENCOUNTER — APPOINTMENT (RX ONLY)
Dept: URBAN - METROPOLITAN AREA CLINIC 153 | Facility: CLINIC | Age: 68
Setting detail: DERMATOLOGY
End: 2023-05-30

## 2023-05-30 DIAGNOSIS — D22 MELANOCYTIC NEVI: ICD-10-CM

## 2023-05-30 DIAGNOSIS — L29.89 OTHER PRURITUS: ICD-10-CM

## 2023-05-30 DIAGNOSIS — L57.0 ACTINIC KERATOSIS: ICD-10-CM

## 2023-05-30 DIAGNOSIS — L29.8 OTHER PRURITUS: ICD-10-CM

## 2023-05-30 PROBLEM — D22.9 MELANOCYTIC NEVI, UNSPECIFIED: Status: ACTIVE | Noted: 2023-05-30

## 2023-05-30 PROCEDURE — 17000 DESTRUCT PREMALG LESION: CPT

## 2023-05-30 PROCEDURE — 17003 DESTRUCT PREMALG LES 2-14: CPT

## 2023-05-30 PROCEDURE — 99213 OFFICE O/P EST LOW 20 MIN: CPT | Mod: 25

## 2023-05-30 PROCEDURE — ? COUNSELING

## 2023-05-30 PROCEDURE — ? LIQUID NITROGEN

## 2023-05-30 PROCEDURE — ? PRESCRIPTION

## 2023-05-30 RX ORDER — DOXEPIN HYDROCHLORIDE 10 MG/ML
SOLUTION ORAL
Qty: 120 | Refills: 11 | Status: ERX | COMMUNITY
Start: 2023-05-30

## 2023-05-30 RX ADMIN — DOXEPIN HYDROCHLORIDE: 10 SOLUTION ORAL at 00:00

## 2023-05-30 ASSESSMENT — LOCATION ZONE DERM
LOCATION ZONE: SCALP
LOCATION ZONE: LIP
LOCATION ZONE: FACE

## 2023-05-30 ASSESSMENT — LOCATION DETAILED DESCRIPTION DERM
LOCATION DETAILED: MID-FRONTAL SCALP
LOCATION DETAILED: LEFT MEDIAL MALAR CHEEK
LOCATION DETAILED: LEFT INFERIOR VERMILION BORDER
LOCATION DETAILED: LEFT SUPERIOR FOREHEAD
LOCATION DETAILED: LEFT CENTRAL MANDIBULAR CHEEK
LOCATION DETAILED: RIGHT SUPERIOR PARIETAL SCALP
LOCATION DETAILED: LEFT SUPERIOR OCCIPITAL SCALP
LOCATION DETAILED: RIGHT SUPERIOR OCCIPITAL SCALP

## 2023-05-30 ASSESSMENT — LOCATION SIMPLE DESCRIPTION DERM
LOCATION SIMPLE: LEFT LOWER LIP
LOCATION SIMPLE: ANTERIOR SCALP
LOCATION SIMPLE: SCALP
LOCATION SIMPLE: LEFT FOREHEAD
LOCATION SIMPLE: LEFT CHEEK
LOCATION SIMPLE: LEFT OCCIPITAL SCALP
LOCATION SIMPLE: RIGHT OCCIPITAL SCALP

## 2023-05-30 NOTE — PROCEDURE: LIQUID NITROGEN
Duration Of Freeze Thaw-Cycle (Seconds): 5
Show Aperture Variable?: Yes
Render Post-Care Instructions In Note?: no
Detail Level: Detailed
Consent: The patient's consent was obtained including but not limited to risks of crusting, scabbing, blistering, scarring, darker or lighter pigmentary change, recurrence, incomplete removal and infection.
Post-Care Instructions: I reviewed with the patient in detail post-care instructions. Patient is to wear sunprotection, and avoid picking at any of the treated lesions. Pt may apply Vaseline to crusted or scabbing areas.

## 2023-11-30 ENCOUNTER — APPOINTMENT (RX ONLY)
Dept: URBAN - METROPOLITAN AREA CLINIC 153 | Facility: CLINIC | Age: 68
Setting detail: DERMATOLOGY
End: 2023-11-30

## 2023-11-30 DIAGNOSIS — L81.4 OTHER MELANIN HYPERPIGMENTATION: ICD-10-CM

## 2023-11-30 DIAGNOSIS — L57.8 OTHER SKIN CHANGES DUE TO CHRONIC EXPOSURE TO NONIONIZING RADIATION: ICD-10-CM

## 2023-11-30 DIAGNOSIS — L57.0 ACTINIC KERATOSIS: ICD-10-CM

## 2023-11-30 PROBLEM — D48.5 NEOPLASM OF UNCERTAIN BEHAVIOR OF SKIN: Status: ACTIVE | Noted: 2023-11-30

## 2023-11-30 PROCEDURE — ? COUNSELING

## 2023-11-30 PROCEDURE — ? SUNSCREEN RECOMMENDATIONS

## 2023-11-30 PROCEDURE — 99213 OFFICE O/P EST LOW 20 MIN: CPT | Mod: 25

## 2023-11-30 PROCEDURE — 11102 TANGNTL BX SKIN SINGLE LES: CPT

## 2023-11-30 PROCEDURE — ? BIOPSY BY SHAVE METHOD

## 2023-11-30 ASSESSMENT — LOCATION ZONE DERM
LOCATION ZONE: ARM
LOCATION ZONE: SCALP

## 2023-11-30 ASSESSMENT — LOCATION SIMPLE DESCRIPTION DERM
LOCATION SIMPLE: LEFT OCCIPITAL SCALP
LOCATION SIMPLE: LEFT SHOULDER
LOCATION SIMPLE: RIGHT SHOULDER

## 2023-11-30 ASSESSMENT — LOCATION DETAILED DESCRIPTION DERM
LOCATION DETAILED: LEFT POSTERIOR SHOULDER
LOCATION DETAILED: LEFT SUPERIOR OCCIPITAL SCALP
LOCATION DETAILED: RIGHT POSTERIOR SHOULDER

## 2023-12-06 ENCOUNTER — RX ONLY (OUTPATIENT)
Age: 68
Setting detail: RX ONLY
End: 2023-12-06

## 2023-12-06 RX ORDER — IMIQUIMOD 12.5 MG/.25G
CREAM TOPICAL
Qty: 6 | Refills: 0 | Status: ERX | COMMUNITY
Start: 2023-12-05

## 2024-06-24 ENCOUNTER — APPOINTMENT (RX ONLY)
Dept: URBAN - METROPOLITAN AREA CLINIC 153 | Facility: CLINIC | Age: 69
Setting detail: DERMATOLOGY
End: 2024-06-24

## 2024-06-24 DIAGNOSIS — L81.4 OTHER MELANIN HYPERPIGMENTATION: ICD-10-CM

## 2024-06-24 DIAGNOSIS — L57.8 OTHER SKIN CHANGES DUE TO CHRONIC EXPOSURE TO NONIONIZING RADIATION: ICD-10-CM

## 2024-06-24 DIAGNOSIS — L57.0 ACTINIC KERATOSIS: ICD-10-CM

## 2024-06-24 DIAGNOSIS — D22 MELANOCYTIC NEVI: ICD-10-CM

## 2024-06-24 PROBLEM — D22.9 MELANOCYTIC NEVI, UNSPECIFIED: Status: ACTIVE | Noted: 2024-06-24

## 2024-06-24 PROCEDURE — 17000 DESTRUCT PREMALG LESION: CPT

## 2024-06-24 PROCEDURE — ? LIQUID NITROGEN

## 2024-06-24 PROCEDURE — ? COUNSELING

## 2024-06-24 PROCEDURE — 99213 OFFICE O/P EST LOW 20 MIN: CPT | Mod: 25

## 2024-06-24 PROCEDURE — ? SUNSCREEN RECOMMENDATIONS

## 2024-06-24 ASSESSMENT — LOCATION SIMPLE DESCRIPTION DERM: LOCATION SIMPLE: RIGHT HAND

## 2024-06-24 ASSESSMENT — LOCATION DETAILED DESCRIPTION DERM: LOCATION DETAILED: RIGHT ULNAR DORSAL HAND

## 2024-06-24 ASSESSMENT — LOCATION ZONE DERM: LOCATION ZONE: HAND

## 2024-06-24 NOTE — PROCEDURE: LIQUID NITROGEN
Consent: The patient's consent was obtained including but not limited to risks of crusting, scabbing, blistering, scarring, darker or lighter pigmentary change, recurrence, incomplete removal and infection.
Render Post-Care Instructions In Note?: no
Detail Level: Detailed
Show Aperture Variable?: Yes
Post-Care Instructions: I reviewed with the patient in detail post-care instructions. Patient is to wear sunprotection, and avoid picking at any of the treated lesions. Pt may apply Vaseline to crusted or scabbing areas.
Duration Of Freeze Thaw-Cycle (Seconds): 5

## 2024-06-24 NOTE — PROCEDURE: MIPS QUALITY
Quality 226: Preventive Care And Screening: Tobacco Use: Screening And Cessation Intervention: Patient screened for tobacco use and is an ex/non-smoker
Name And Contact Information For Health Care Proxy: Payam [phone]
Detail Level: Detailed
Quality 431: Preventive Care And Screening: Unhealthy Alcohol Use - Screening: Patient not identified as an unhealthy alcohol user when screened for unhealthy alcohol use using a systematic screening method
Quality 130: Documentation Of Current Medications In The Medical Record: Current Medications Documented

## 2025-01-08 ENCOUNTER — APPOINTMENT (OUTPATIENT)
Dept: URBAN - METROPOLITAN AREA CLINIC 153 | Facility: CLINIC | Age: 70
Setting detail: DERMATOLOGY
End: 2025-01-08

## 2025-01-08 DIAGNOSIS — L82.0 INFLAMED SEBORRHEIC KERATOSIS: ICD-10-CM

## 2025-01-08 DIAGNOSIS — L82.1 OTHER SEBORRHEIC KERATOSIS: ICD-10-CM

## 2025-01-08 DIAGNOSIS — L21.8 OTHER SEBORRHEIC DERMATITIS: ICD-10-CM

## 2025-01-08 PROCEDURE — ? PRESCRIPTION

## 2025-01-08 PROCEDURE — ? LIQUID NITROGEN

## 2025-01-08 PROCEDURE — ? COUNSELING

## 2025-01-08 PROCEDURE — 99213 OFFICE O/P EST LOW 20 MIN: CPT | Mod: 25

## 2025-01-08 PROCEDURE — 17110 DESTRUCTION B9 LES UP TO 14: CPT

## 2025-01-08 RX ORDER — KETOCONAZOLE 20 MG/ML
SHAMPOO, SUSPENSION TOPICAL
Qty: 120 | Refills: 2 | Status: ERX | COMMUNITY
Start: 2025-01-08

## 2025-01-08 RX ADMIN — KETOCONAZOLE: 20 SHAMPOO, SUSPENSION TOPICAL at 00:00

## 2025-01-08 ASSESSMENT — LOCATION SIMPLE DESCRIPTION DERM
LOCATION SIMPLE: ABDOMEN
LOCATION SIMPLE: POSTERIOR SCALP
LOCATION SIMPLE: ANTERIOR SCALP
LOCATION SIMPLE: SCALP
LOCATION SIMPLE: LEFT UPPER BACK
LOCATION SIMPLE: RIGHT UPPER BACK

## 2025-01-08 ASSESSMENT — LOCATION DETAILED DESCRIPTION DERM
LOCATION DETAILED: RIGHT INFERIOR POSTAURICULAR SKIN
LOCATION DETAILED: POSTERIOR MID-PARIETAL SCALP
LOCATION DETAILED: RIGHT SUPERIOR UPPER BACK
LOCATION DETAILED: LEFT SUPERIOR UPPER BACK
LOCATION DETAILED: MID-FRONTAL SCALP
LOCATION DETAILED: RIGHT RIB CAGE

## 2025-01-08 ASSESSMENT — LOCATION ZONE DERM
LOCATION ZONE: TRUNK
LOCATION ZONE: SCALP

## 2025-01-08 NOTE — PROCEDURE: MIPS QUALITY
Quality 226: Preventive Care And Screening: Tobacco Use: Screening And Cessation Intervention: Patient screened for tobacco use and is an ex/non-smoker
Name And Contact Information For Health Care Proxy: Payam [phone]
Detail Level: Detailed
Quality 431: Preventive Care And Screening: Unhealthy Alcohol Use - Screening: Patient not identified as an unhealthy alcohol user when screened for unhealthy alcohol use using a systematic screening method
Quality 130: Documentation Of Current Medications In The Medical Record: Current Medications Documented

## 2025-01-08 NOTE — PROCEDURE: LIQUID NITROGEN
Post-Care Instructions: I reviewed with the patient in detail post-care instructions. Patient is to wear sunprotection, and avoid picking at any of the treated lesions. Pt may apply Vaseline to crusted or scabbing areas.
Include Z78.9 (Other Specified Conditions Influencing Health Status) As An Associated Diagnosis?: No
Detail Level: Detailed
Show Topical Anesthesia Variable?: Yes
Spray Paint Text: The liquid nitrogen was applied to the skin utilizing a spray paint frosting technique.
Medical Necessity Clause: This procedure was medically necessary because the lesions that were treated were:
Medical Necessity Information: It is in your best interest to select a reason for this procedure from the list below. All of these items fulfill various CMS LCD requirements except the new and changing color options.
Consent: The patient's consent was obtained including but not limited to risks of crusting, scabbing, blistering, scarring, darker or lighter pigmentary change, recurrence, incomplete removal and infection.

## 2025-05-05 NOTE — PROCEDURE: COUNSELING
Specialty Pharmacy Patient Management Program  [facility] Management [facility] Refill Outreach      Carlota was contacted today regarding refills of her medication(s).    Specialty medication(s) and dose(s) confirmed: repatha sureclick    Refill Questions      Flowsheet Row Most Recent Value   Changes to allergies? No   Changes to medications? No   New conditions or infections since last clinic visit No   Unplanned office visit, urgent care, ED, or hospital admission in the last 4 weeks  No   How does patient/caregiver feel medication is working? Very good   Financial problems or insurance changes  No   Since the previous refill, were any specialty medication doses or scheduled injections missed or delayed?  No   Does this patient require a clinical escalation to a pharmacist? No          Delivery Questions      Flowsheet Row Most Recent Value   Delivery method  at Pharmacy  [[facility] ]   Delivery address verified with patient/caregiver? No  [apothecary ]   Number of medications in delivery 1   Medication(s) being filled and delivered Evolocumab (REPATHA)   Doses left of specialty medications 0   Copay verified? Yes   Copay amount $30.00   Copay form of payment Credit/debit on file   Delivery Date Selection --  [apothecary ]   Signature Required No   Do you consent to receive electronic handouts?  Yes            Follow-Up: 56 days    Chelita Castillo, PharmD  5/5/2025  16:16 EDT  
Detail Level: Detailed

## 2025-06-11 ENCOUNTER — RX ONLY (RX ONLY)
Age: 70
End: 2025-06-11

## 2025-07-09 ENCOUNTER — APPOINTMENT (OUTPATIENT)
Dept: URBAN - METROPOLITAN AREA CLINIC 153 | Facility: CLINIC | Age: 70
Setting detail: DERMATOLOGY
End: 2025-07-09

## 2025-07-09 DIAGNOSIS — L82.1 OTHER SEBORRHEIC KERATOSIS: ICD-10-CM

## 2025-07-09 DIAGNOSIS — L57.0 ACTINIC KERATOSIS: ICD-10-CM

## 2025-07-09 DIAGNOSIS — D18.0 HEMANGIOMA: ICD-10-CM

## 2025-07-09 DIAGNOSIS — L81.4 OTHER MELANIN HYPERPIGMENTATION: ICD-10-CM

## 2025-07-09 DIAGNOSIS — L72.0 EPIDERMAL CYST: ICD-10-CM

## 2025-07-09 DIAGNOSIS — L82.0 INFLAMED SEBORRHEIC KERATOSIS: ICD-10-CM

## 2025-07-09 PROBLEM — D18.01 HEMANGIOMA OF SKIN AND SUBCUTANEOUS TISSUE: Status: ACTIVE | Noted: 2025-07-09

## 2025-07-09 PROCEDURE — ? LIQUID NITROGEN

## 2025-07-09 PROCEDURE — ? PRESCRIPTION

## 2025-07-09 PROCEDURE — ? CONSULTATION EXCISION

## 2025-07-09 PROCEDURE — ? COUNSELING

## 2025-07-09 RX ORDER — CEFDINIR 300 MG/1
CAPSULE ORAL
Qty: 4 | Refills: 0 | Status: ERX | COMMUNITY
Start: 2025-07-09

## 2025-07-09 RX ADMIN — CEFDINIR: 300 CAPSULE ORAL at 00:00

## 2025-07-09 ASSESSMENT — LOCATION DETAILED DESCRIPTION DERM
LOCATION DETAILED: RIGHT RIB CAGE
LOCATION DETAILED: LEFT MEDIAL FRONTAL SCALP
LOCATION DETAILED: LEFT SUPERIOR UPPER BACK
LOCATION DETAILED: RIGHT RADIAL DORSAL HAND
LOCATION DETAILED: LEFT RIB CAGE
LOCATION DETAILED: RIGHT SUPERIOR UPPER BACK
LOCATION DETAILED: RIGHT SUPERIOR OCCIPITAL SCALP
LOCATION DETAILED: LEFT SUPERIOR LATERAL MIDBACK

## 2025-07-09 ASSESSMENT — LOCATION SIMPLE DESCRIPTION DERM
LOCATION SIMPLE: LEFT SCALP
LOCATION SIMPLE: ABDOMEN
LOCATION SIMPLE: RIGHT UPPER BACK
LOCATION SIMPLE: LEFT LOWER BACK
LOCATION SIMPLE: LEFT UPPER BACK
LOCATION SIMPLE: RIGHT HAND
LOCATION SIMPLE: RIGHT OCCIPITAL SCALP

## 2025-07-09 ASSESSMENT — LOCATION ZONE DERM
LOCATION ZONE: HAND
LOCATION ZONE: TRUNK
LOCATION ZONE: SCALP

## 2025-07-09 NOTE — PROCEDURE: LIQUID NITROGEN
Show Applicator Variable?: Yes
Spray Paint Text: The liquid nitrogen was applied to the skin utilizing a spray paint frosting technique.
Add 52 Modifier (Optional): no
Medical Necessity Information: It is in your best interest to select a reason for this procedure from the list below. All of these items fulfill various CMS LCD requirements except the new and changing color options.
Post-Care Instructions: I reviewed with the patient in detail post-care instructions. Patient is to wear sunprotection, and avoid picking at any of the treated lesions. Pt may apply Vaseline to crusted or scabbing areas.
Medical Necessity Clause: This procedure was medically necessary because the lesions that were treated were:
Consent: The patient's consent was obtained including but not limited to risks of crusting, scabbing, blistering, scarring, darker or lighter pigmentary change, recurrence, incomplete removal and infection.
Detail Level: Detailed
Duration Of Freeze Thaw-Cycle (Seconds): 5

## 2025-07-21 ENCOUNTER — APPOINTMENT (OUTPATIENT)
Dept: URBAN - METROPOLITAN AREA CLINIC 153 | Facility: CLINIC | Age: 70
Setting detail: DERMATOLOGY
End: 2025-07-21

## 2025-07-21 DIAGNOSIS — L72.0 EPIDERMAL CYST: ICD-10-CM

## 2025-07-21 PROCEDURE — ? EXCISION

## 2025-07-21 ASSESSMENT — LOCATION SIMPLE DESCRIPTION DERM: LOCATION SIMPLE: LEFT BREAST

## 2025-07-21 ASSESSMENT — LOCATION ZONE DERM: LOCATION ZONE: TRUNK

## 2025-07-21 ASSESSMENT — LOCATION DETAILED DESCRIPTION DERM: LOCATION DETAILED: LEFT INFRAMAMMARY CREASE (INNER QUADRANT)

## 2025-07-21 NOTE — PROCEDURE: EXCISION
